# Patient Record
Sex: MALE | Race: WHITE | NOT HISPANIC OR LATINO
[De-identification: names, ages, dates, MRNs, and addresses within clinical notes are randomized per-mention and may not be internally consistent; named-entity substitution may affect disease eponyms.]

---

## 2020-10-16 PROBLEM — Z00.00 ENCOUNTER FOR PREVENTIVE HEALTH EXAMINATION: Status: ACTIVE | Noted: 2020-10-16

## 2020-11-06 ENCOUNTER — APPOINTMENT (OUTPATIENT)
Dept: CARDIOLOGY | Facility: CLINIC | Age: 80
End: 2020-11-06

## 2020-12-07 ENCOUNTER — APPOINTMENT (OUTPATIENT)
Dept: PULMONOLOGY | Facility: CLINIC | Age: 80
End: 2020-12-07
Payer: MEDICARE

## 2020-12-07 DIAGNOSIS — R91.1 SOLITARY PULMONARY NODULE: ICD-10-CM

## 2020-12-07 DIAGNOSIS — R91.8 OTHER NONSPECIFIC ABNORMAL FINDING OF LUNG FIELD: ICD-10-CM

## 2020-12-07 DIAGNOSIS — Z87.891 PERSONAL HISTORY OF NICOTINE DEPENDENCE: ICD-10-CM

## 2020-12-07 DIAGNOSIS — Z86.79 PERSONAL HISTORY OF OTHER DISEASES OF THE CIRCULATORY SYSTEM: ICD-10-CM

## 2020-12-07 PROCEDURE — 99072 ADDL SUPL MATRL&STAF TM PHE: CPT

## 2020-12-07 PROCEDURE — 99203 OFFICE O/P NEW LOW 30 MIN: CPT

## 2020-12-18 VITALS
HEART RATE: 68 BPM | DIASTOLIC BLOOD PRESSURE: 80 MMHG | RESPIRATION RATE: 16 BRPM | SYSTOLIC BLOOD PRESSURE: 140 MMHG | HEIGHT: 67 IN | OXYGEN SATURATION: 96 %

## 2020-12-18 PROBLEM — R91.1 LESION OF LUNG: Status: ACTIVE | Noted: 2020-12-07

## 2020-12-18 PROBLEM — R91.8 ABNORMAL FINDING ON LUNG IMAGING: Status: ACTIVE | Noted: 2020-12-18

## 2020-12-18 NOTE — DISCUSSION/SUMMARY
[FreeTextEntry1] : 79 yo male with incidentally noted pulmonary nodule. The various diagnostic possibilities were discussed. A PET/CT will be performed. Further intervention will depend on results. He is to follow up with her PMD as before and for the influenza vaccine.

## 2020-12-18 NOTE — HISTORY OF PRESENT ILLNESS
[Former] : former [>= 30 pack years] : >= 30 pack years [TextBox_4] : 81 yo male presents for evaluation of abnormal lung finding on abdominal CT performed by SER[facility]. The patient denies pulmonary complaints. He denies fever, weight loss, night sweats or hemoptysis. [TextBox_11] : 1 [TextBox_13] : 45 [YearQuit] : 1985 [TextBox_29] : Denies snoring, daytime somnolence, apneic episodes, AM headaches

## 2021-02-10 ENCOUNTER — NON-APPOINTMENT (OUTPATIENT)
Age: 81
End: 2021-02-10

## 2021-02-10 ENCOUNTER — APPOINTMENT (OUTPATIENT)
Dept: CARDIOLOGY | Facility: CLINIC | Age: 81
End: 2021-02-10
Payer: MEDICARE

## 2021-02-10 VITALS
WEIGHT: 210 LBS | DIASTOLIC BLOOD PRESSURE: 82 MMHG | HEART RATE: 87 BPM | TEMPERATURE: 97.1 F | RESPIRATION RATE: 16 BRPM | OXYGEN SATURATION: 98 % | SYSTOLIC BLOOD PRESSURE: 168 MMHG | BODY MASS INDEX: 33.75 KG/M2 | HEIGHT: 66 IN

## 2021-02-10 VITALS — SYSTOLIC BLOOD PRESSURE: 142 MMHG | DIASTOLIC BLOOD PRESSURE: 76 MMHG

## 2021-02-10 PROCEDURE — 99204 OFFICE O/P NEW MOD 45 MIN: CPT | Mod: 25

## 2021-02-10 PROCEDURE — 93000 ELECTROCARDIOGRAM COMPLETE: CPT

## 2021-02-10 PROCEDURE — 99072 ADDL SUPL MATRL&STAF TM PHE: CPT

## 2021-02-10 NOTE — HISTORY OF PRESENT ILLNESS
[FreeTextEntry1] : Patient is an 80 year old man with a history of CAD status post stents in the past, HTN, hyperlipidemia, and former tobacco use who presents today for establishment of care for his coronary artery disease. He states that he needs to see a new Cardiologist as his previous Cardiologist is not currently practicing. He states that he does cardiac tests every year. He states that he has been feeling well denying any chest pain, dyspnea at res, palpitations, headaches, and dizziness. He denies any limitations with his activities of daily living.

## 2021-02-10 NOTE — DISCUSSION/SUMMARY
[FreeTextEntry1] : IMPRESSION: Mr. GABRIEL is a 80 year old man with a history of CAD status post stents in the past, HTN, hyperlipidemia, and former tobacco use who presents today for establishment of care for his coronary artery disease. \par \par PLAN:\par 1. He states that he has had stents in the past, but could not comment when and to which artery. I have asked him to schedule an echocardiogram and a nuclear stress test given his CAD. He will continue on ASA 81 mg daily and Prasugrel 10 mg daily. We should consider either stopping his Prasugrel or changing to Clopidogrel, however, we decide after his stress test. \par 2. His blood pressure is mildly elevated. I have not made any changes at this time and he will continue on Imdur 30 mg daily, Metoprolol 100 mg daily, and Hyzaar 100-25 mg daily. He understands the importance of diet modification and limiting his sodium intake.\par 3. His goal LDL is less than 70 given his CAD. He will continue on Simvastatin 40 mg daily and will try to obtain his most recent blood test from your office.\par 4. He will follow up with me after his cardiac tests have been performed.

## 2021-02-10 NOTE — PHYSICAL EXAM
[General Appearance - Well Developed] : well developed [Well Groomed] : well groomed [Normal Appearance] : normal appearance [General Appearance - Well Nourished] : well nourished [No Deformities] : no deformities [General Appearance - In No Acute Distress] : no acute distress [Normal Conjunctiva] : the conjunctiva exhibited no abnormalities [FreeTextEntry1] : He was wearing a face mask during the examination.  [Normal Jugular Venous A Waves Present] : normal jugular venous A waves present [Normal Jugular Venous V Waves Present] : normal jugular venous V waves present [No Jugular Venous Doherty A Waves] : no jugular venous doherty A waves [Respiration, Rhythm And Depth] : normal respiratory rhythm and effort [Exaggerated Use Of Accessory Muscles For Inspiration] : no accessory muscle use [Auscultation Breath Sounds / Voice Sounds] : lungs were clear to auscultation bilaterally [Normal Rate] : normal [Rhythm Regular] : regular [Normal S1] : normal S1 [Normal S2] : normal S2 [S3] : no S3 [Right Carotid Bruit] : no bruit heard over the right carotid [I] : a grade 1 [Left Carotid Bruit] : no bruit heard over the left carotid [Bruit] : no bruit heard [No Pitting Edema] : no pitting edema present [Bowel Sounds] : normal bowel sounds [Abdomen Soft] : soft [Abdomen Tenderness] : non-tender [Abdomen Mass (___ Cm)] : no abdominal mass palpated [Abnormal Walk] : normal gait [Gait - Sufficient For Exercise Testing] : the gait was sufficient for exercise testing [Cyanosis, Localized] : no localized cyanosis [Nail Clubbing] : no clubbing of the fingernails [Petechial Hemorrhages (___cm)] : no petechial hemorrhages [Skin Color & Pigmentation] : normal skin color and pigmentation [] : no rash [No Skin Ulcers] : no skin ulcer [Oriented To Time, Place, And Person] : oriented to person, place, and time [Affect] : the affect was normal [Mood] : the mood was normal [No Anxiety] : not feeling anxious

## 2021-04-14 ENCOUNTER — APPOINTMENT (OUTPATIENT)
Dept: CARDIOLOGY | Facility: CLINIC | Age: 81
End: 2021-04-14
Payer: MEDICARE

## 2021-04-14 PROCEDURE — 99072 ADDL SUPL MATRL&STAF TM PHE: CPT

## 2021-04-14 PROCEDURE — 78452 HT MUSCLE IMAGE SPECT MULT: CPT

## 2021-04-14 PROCEDURE — 93015 CV STRESS TEST SUPVJ I&R: CPT

## 2021-04-14 PROCEDURE — 93306 TTE W/DOPPLER COMPLETE: CPT

## 2021-04-14 PROCEDURE — A9500: CPT

## 2021-06-06 ENCOUNTER — INPATIENT (INPATIENT)
Facility: HOSPITAL | Age: 81
LOS: 1 days | Discharge: HOME | DRG: 312 | End: 2021-06-08
Attending: HOSPITALIST | Admitting: HOSPITALIST
Payer: MEDICARE

## 2021-06-06 VITALS
HEART RATE: 80 BPM | OXYGEN SATURATION: 97 % | HEIGHT: 69 IN | DIASTOLIC BLOOD PRESSURE: 76 MMHG | TEMPERATURE: 98 F | WEIGHT: 199.96 LBS | SYSTOLIC BLOOD PRESSURE: 159 MMHG | RESPIRATION RATE: 18 BRPM

## 2021-06-06 LAB
ALBUMIN SERPL ELPH-MCNC: 4.2 G/DL — SIGNIFICANT CHANGE UP (ref 3.3–5)
ALP SERPL-CCNC: 57 U/L — SIGNIFICANT CHANGE UP (ref 40–120)
ALT FLD-CCNC: 17 U/L — SIGNIFICANT CHANGE UP (ref 10–45)
ANION GAP SERPL CALC-SCNC: 17 MMOL/L — SIGNIFICANT CHANGE UP (ref 5–17)
APPEARANCE UR: CLEAR — SIGNIFICANT CHANGE UP
AST SERPL-CCNC: 22 U/L — SIGNIFICANT CHANGE UP (ref 10–40)
BACTERIA # UR AUTO: NEGATIVE — SIGNIFICANT CHANGE UP
BASOPHILS # BLD AUTO: 0.03 K/UL — SIGNIFICANT CHANGE UP (ref 0–0.2)
BASOPHILS NFR BLD AUTO: 0.2 % — SIGNIFICANT CHANGE UP (ref 0–2)
BILIRUB SERPL-MCNC: 0.3 MG/DL — SIGNIFICANT CHANGE UP (ref 0.2–1.2)
BILIRUB UR-MCNC: NEGATIVE — SIGNIFICANT CHANGE UP
BUN SERPL-MCNC: 40 MG/DL — HIGH (ref 7–23)
CALCIUM SERPL-MCNC: 8.8 MG/DL — SIGNIFICANT CHANGE UP (ref 8.4–10.5)
CHLORIDE SERPL-SCNC: 105 MMOL/L — SIGNIFICANT CHANGE UP (ref 96–108)
CO2 SERPL-SCNC: 17 MMOL/L — LOW (ref 22–31)
COLOR SPEC: YELLOW — SIGNIFICANT CHANGE UP
CREAT SERPL-MCNC: 1.88 MG/DL — HIGH (ref 0.5–1.3)
DIFF PNL FLD: NEGATIVE — SIGNIFICANT CHANGE UP
EOSINOPHIL # BLD AUTO: 0.12 K/UL — SIGNIFICANT CHANGE UP (ref 0–0.5)
EOSINOPHIL NFR BLD AUTO: 0.9 % — SIGNIFICANT CHANGE UP (ref 0–6)
EPI CELLS # UR: 1 /HPF — SIGNIFICANT CHANGE UP
GLUCOSE SERPL-MCNC: 123 MG/DL — HIGH (ref 70–99)
GLUCOSE UR QL: NEGATIVE — SIGNIFICANT CHANGE UP
HCT VFR BLD CALC: 38.1 % — LOW (ref 39–50)
HGB BLD-MCNC: 12.5 G/DL — LOW (ref 13–17)
HYALINE CASTS # UR AUTO: 4 /LPF — HIGH (ref 0–2)
IMM GRANULOCYTES NFR BLD AUTO: 0.3 % — SIGNIFICANT CHANGE UP (ref 0–1.5)
KETONES UR-MCNC: NEGATIVE — SIGNIFICANT CHANGE UP
LEUKOCYTE ESTERASE UR-ACNC: NEGATIVE — SIGNIFICANT CHANGE UP
LYMPHOCYTES # BLD AUTO: 0.79 K/UL — LOW (ref 1–3.3)
LYMPHOCYTES # BLD AUTO: 6.1 % — LOW (ref 13–44)
MAGNESIUM SERPL-MCNC: 1.9 MG/DL — SIGNIFICANT CHANGE UP (ref 1.6–2.6)
MCHC RBC-ENTMCNC: 30.3 PG — SIGNIFICANT CHANGE UP (ref 27–34)
MCHC RBC-ENTMCNC: 32.8 GM/DL — SIGNIFICANT CHANGE UP (ref 32–36)
MCV RBC AUTO: 92.5 FL — SIGNIFICANT CHANGE UP (ref 80–100)
MONOCYTES # BLD AUTO: 1.17 K/UL — HIGH (ref 0–0.9)
MONOCYTES NFR BLD AUTO: 9.1 % — SIGNIFICANT CHANGE UP (ref 2–14)
NEUTROPHILS # BLD AUTO: 10.7 K/UL — HIGH (ref 1.8–7.4)
NEUTROPHILS NFR BLD AUTO: 83.4 % — HIGH (ref 43–77)
NITRITE UR-MCNC: NEGATIVE — SIGNIFICANT CHANGE UP
NRBC # BLD: 0 /100 WBCS — SIGNIFICANT CHANGE UP (ref 0–0)
NT-PROBNP SERPL-SCNC: 875 PG/ML — HIGH (ref 0–300)
PH UR: 6 — SIGNIFICANT CHANGE UP (ref 5–8)
PLATELET # BLD AUTO: 191 K/UL — SIGNIFICANT CHANGE UP (ref 150–400)
POTASSIUM SERPL-MCNC: 4.1 MMOL/L — SIGNIFICANT CHANGE UP (ref 3.5–5.3)
POTASSIUM SERPL-SCNC: 4.1 MMOL/L — SIGNIFICANT CHANGE UP (ref 3.5–5.3)
PROT SERPL-MCNC: 7.1 G/DL — SIGNIFICANT CHANGE UP (ref 6–8.3)
PROT UR-MCNC: ABNORMAL
RBC # BLD: 4.12 M/UL — LOW (ref 4.2–5.8)
RBC # FLD: 13.9 % — SIGNIFICANT CHANGE UP (ref 10.3–14.5)
RBC CASTS # UR COMP ASSIST: 1 /HPF — SIGNIFICANT CHANGE UP (ref 0–4)
SODIUM SERPL-SCNC: 139 MMOL/L — SIGNIFICANT CHANGE UP (ref 135–145)
SP GR SPEC: 1.02 — SIGNIFICANT CHANGE UP (ref 1.01–1.02)
TROPONIN T, HIGH SENSITIVITY RESULT: 11 NG/L — SIGNIFICANT CHANGE UP (ref 0–51)
UROBILINOGEN FLD QL: NEGATIVE — SIGNIFICANT CHANGE UP
WBC # BLD: 12.85 K/UL — HIGH (ref 3.8–10.5)
WBC # FLD AUTO: 12.85 K/UL — HIGH (ref 3.8–10.5)
WBC UR QL: 1 /HPF — SIGNIFICANT CHANGE UP (ref 0–5)

## 2021-06-06 PROCEDURE — 99285 EMERGENCY DEPT VISIT HI MDM: CPT

## 2021-06-06 PROCEDURE — 70486 CT MAXILLOFACIAL W/O DYE: CPT | Mod: 26,MA

## 2021-06-06 PROCEDURE — 93010 ELECTROCARDIOGRAM REPORT: CPT

## 2021-06-06 NOTE — ED PROVIDER NOTE - CLINICAL SUMMARY MEDICAL DECISION MAKING FREE TEXT BOX
Unwitnessed fall concern of syncope - high risk of arrhythmia or cardiac cause of syncope. CT to eval trauma, labs, ekg and admit

## 2021-06-06 NOTE — ED PROVIDER NOTE - NS ED ROS FT

## 2021-06-06 NOTE — ED PROVIDER NOTE - PROGRESS NOTE DETAILS
Tigre Duarte DO PGY3 - per family has cardiac w/u 1 month ago that was unremarkable - do not know if it was a stress test or an echo Attending MD Jennings: CT ?NPH, will admit to medicine

## 2021-06-06 NOTE — ED PROVIDER NOTE - OBJECTIVE STATEMENT
80yo M hx CAD s/p stent, CHF?, on DAP s/p unwitnessed fall at home onto floor now w/ ecchymosis to R orbit. Pt does not remember event and feels well now w/o any symptoms. Family states he is at baseline mental status now and is usual for him to be forgetful but has nto had an event like this. Pt was unwitnessed for 20 mins and was found awake/alert but diaphoretic and "hypotensive". 82yo M hx CAD s/p stent, CHF?, on DAP s/p unwitnessed fall at home onto floor now w/ ecchymosis to R orbit. Pt does not remember event and feels well now w/o any symptoms. Family states he is at baseline mental status now and is usual for him to be forgetful but has nto had an event like this. Pt was unwitnessed for 20 mins and was found awake/alert but diaphoretic and "hypotensive" per dtr at bedside.

## 2021-06-06 NOTE — ED PROVIDER NOTE - SHIFT CHANGE DETAILS
Attending MD Jennings: 81M found down, (20-30 min), no recall, cardiac hx, R eye ecchy, abrasion to face, CTH/CTMF/CTCSp, found hypotensive and diaphoretic, needs syncope work up, TBA

## 2021-06-06 NOTE — ED PROVIDER NOTE - ATTENDING CONTRIBUTION TO CARE
81y M hx of HTN, HLD, cardiac dz, on ASA and Effient here sp unwitnessed fall vs syncope. Has ecchymoses to R eyelid and abrasion over R eyebrow, PERRL, no midline spinal TTP, no ext or chest/back/abd ecchymoses, FROM in ext x4, awake alert, no recall for event. Dtr states down for max 20-30min. When found clammy, with "low BP." Normotensive here. Denies CP, dizziness, SOB, pain. CT head, MF, c/s to r/o traumatic injury. Labs, EKG, cardiac eval for possible syncope. TBA. 81y M hx of HTN, HLD, cardiac dz, on ASA and Effient here sp unwitnessed fall vs syncope. Has ecchymoses to R eyelid and abrasion over R eyebrow, PERRL, no midline spinal TTP, no ext or chest/back/abd ecchymoses, FROM in ext x4, awake alert, no recall for event-which dtr states is unusual for pt. Dtr states down for max 20-30min as was seen 20-30 min prior to finding down. When found clammy, with "low BP" but awake and alert. No incontinence. Normotensive here. Denies CP, dizziness, SOB, pain. CT head, MF, c/s to r/o traumatic injury. Labs, EKG, cardiac eval for possible syncope, less likely seizure. TBA.

## 2021-06-06 NOTE — ED PROVIDER NOTE - PHYSICAL EXAMINATION
Gen: Well appearing, NAD  Head: ecchymosis/abrasions surrounding R orbit  HEENT: PERRL, EOMI, MMM, normal conjunctiva, anicteric, neck supple  Lung: CTAB, no adventitious sounds  CV: RRR, no murmurs  Abd: soft, NTND, no rebound or guarding, no CVAT  MSK: No edema, no visible deformities  Neuro: CN II-XII grossly intact. 5/5 strength and normal sensation in all extremities. Ambulatory with stable gait.   Skin: Warm and dry, no evidence of rash  Psych: normal mood and affect Gen: Well appearing, NAD  Head: ecchymosis/abrasions surrounding R orbit and bridge of nose  HEENT: PERRL, EOMI, MMM, normal conjunctiva, anicteric, neck supple, no septal hematoma  Lung: CTAB, no adventitious sounds  CV: RRR, no murmurs  Abd: soft, NTND, no rebound or guarding, no CVAT  MSK: No edema, no visible deformities, no midline ttp, no bony ttp  Neuro: CN II-XII grossly intact. 5/5 strength and normal sensation in all extremities. Ambulatory with stable gait.   Skin: Warm and dry, no evidence of rash  Psych: normal mood and affect

## 2021-06-07 DIAGNOSIS — Z29.9 ENCOUNTER FOR PROPHYLACTIC MEASURES, UNSPECIFIED: ICD-10-CM

## 2021-06-07 DIAGNOSIS — I10 ESSENTIAL (PRIMARY) HYPERTENSION: ICD-10-CM

## 2021-06-07 DIAGNOSIS — N19 UNSPECIFIED KIDNEY FAILURE: ICD-10-CM

## 2021-06-07 DIAGNOSIS — R55 SYNCOPE AND COLLAPSE: ICD-10-CM

## 2021-06-07 DIAGNOSIS — I25.10 ATHEROSCLEROTIC HEART DISEASE OF NATIVE CORONARY ARTERY WITHOUT ANGINA PECTORIS: ICD-10-CM

## 2021-06-07 LAB
24R-OH-CALCIDIOL SERPL-MCNC: 41 NG/ML — SIGNIFICANT CHANGE UP (ref 30–80)
ANION GAP SERPL CALC-SCNC: 14 MMOL/L — SIGNIFICANT CHANGE UP (ref 5–17)
BUN SERPL-MCNC: 41 MG/DL — HIGH (ref 7–23)
CALCIUM SERPL-MCNC: 9.1 MG/DL — SIGNIFICANT CHANGE UP (ref 8.4–10.5)
CHLORIDE SERPL-SCNC: 105 MMOL/L — SIGNIFICANT CHANGE UP (ref 96–108)
CO2 SERPL-SCNC: 19 MMOL/L — LOW (ref 22–31)
CREAT SERPL-MCNC: 1.72 MG/DL — HIGH (ref 0.5–1.3)
GLUCOSE SERPL-MCNC: 107 MG/DL — HIGH (ref 70–99)
HCT VFR BLD CALC: 37.9 % — LOW (ref 39–50)
HGB BLD-MCNC: 12.4 G/DL — LOW (ref 13–17)
MCHC RBC-ENTMCNC: 30.4 PG — SIGNIFICANT CHANGE UP (ref 27–34)
MCHC RBC-ENTMCNC: 32.7 GM/DL — SIGNIFICANT CHANGE UP (ref 32–36)
MCV RBC AUTO: 92.9 FL — SIGNIFICANT CHANGE UP (ref 80–100)
NRBC # BLD: 0 /100 WBCS — SIGNIFICANT CHANGE UP (ref 0–0)
PLATELET # BLD AUTO: 182 K/UL — SIGNIFICANT CHANGE UP (ref 150–400)
POTASSIUM SERPL-MCNC: 4.3 MMOL/L — SIGNIFICANT CHANGE UP (ref 3.5–5.3)
POTASSIUM SERPL-SCNC: 4.3 MMOL/L — SIGNIFICANT CHANGE UP (ref 3.5–5.3)
RBC # BLD: 4.08 M/UL — LOW (ref 4.2–5.8)
RBC # FLD: 13.9 % — SIGNIFICANT CHANGE UP (ref 10.3–14.5)
SARS-COV-2 RNA SPEC QL NAA+PROBE: SIGNIFICANT CHANGE UP
SODIUM SERPL-SCNC: 138 MMOL/L — SIGNIFICANT CHANGE UP (ref 135–145)
T PALLIDUM AB TITR SER: NEGATIVE — SIGNIFICANT CHANGE UP
TROPONIN T, HIGH SENSITIVITY RESULT: 9 NG/L — SIGNIFICANT CHANGE UP (ref 0–51)
TSH SERPL-MCNC: 0.32 UIU/ML — SIGNIFICANT CHANGE UP (ref 0.27–4.2)
VIT B12 SERPL-MCNC: 738 PG/ML — SIGNIFICANT CHANGE UP (ref 232–1245)
WBC # BLD: 10.58 K/UL — HIGH (ref 3.8–10.5)
WBC # FLD AUTO: 10.58 K/UL — HIGH (ref 3.8–10.5)

## 2021-06-07 PROCEDURE — 70450 CT HEAD/BRAIN W/O DYE: CPT | Mod: 26,MA

## 2021-06-07 PROCEDURE — 76377 3D RENDER W/INTRP POSTPROCES: CPT | Mod: 26

## 2021-06-07 PROCEDURE — 72125 CT NECK SPINE W/O DYE: CPT | Mod: 26,MA

## 2021-06-07 PROCEDURE — 12345: CPT | Mod: NC

## 2021-06-07 PROCEDURE — 71045 X-RAY EXAM CHEST 1 VIEW: CPT | Mod: 26

## 2021-06-07 PROCEDURE — 99223 1ST HOSP IP/OBS HIGH 75: CPT

## 2021-06-07 RX ORDER — PRASUGREL 5 MG/1
1 TABLET, FILM COATED ORAL
Qty: 0 | Refills: 0 | DISCHARGE

## 2021-06-07 RX ORDER — ISOSORBIDE MONONITRATE 60 MG/1
30 TABLET, EXTENDED RELEASE ORAL DAILY
Refills: 0 | Status: DISCONTINUED | OUTPATIENT
Start: 2021-06-07 | End: 2021-06-08

## 2021-06-07 RX ORDER — METOPROLOL TARTRATE 50 MG
100 TABLET ORAL DAILY
Refills: 0 | Status: DISCONTINUED | OUTPATIENT
Start: 2021-06-07 | End: 2021-06-08

## 2021-06-07 RX ORDER — HEPARIN SODIUM 5000 [USP'U]/ML
5000 INJECTION INTRAVENOUS; SUBCUTANEOUS EVERY 8 HOURS
Refills: 0 | Status: DISCONTINUED | OUTPATIENT
Start: 2021-06-07 | End: 2021-06-08

## 2021-06-07 RX ORDER — ACETAMINOPHEN 500 MG
650 TABLET ORAL EVERY 6 HOURS
Refills: 0 | Status: DISCONTINUED | OUTPATIENT
Start: 2021-06-07 | End: 2021-06-08

## 2021-06-07 RX ORDER — PRASUGREL 5 MG/1
10 TABLET, FILM COATED ORAL DAILY
Refills: 0 | Status: DISCONTINUED | OUTPATIENT
Start: 2021-06-07 | End: 2021-06-08

## 2021-06-07 RX ORDER — SIMVASTATIN 20 MG/1
40 TABLET, FILM COATED ORAL AT BEDTIME
Refills: 0 | Status: DISCONTINUED | OUTPATIENT
Start: 2021-06-07 | End: 2021-06-08

## 2021-06-07 RX ORDER — METOPROLOL TARTRATE 50 MG
1 TABLET ORAL
Qty: 0 | Refills: 0 | DISCHARGE

## 2021-06-07 RX ORDER — ASPIRIN/CALCIUM CARB/MAGNESIUM 324 MG
1 TABLET ORAL
Qty: 0 | Refills: 0 | DISCHARGE

## 2021-06-07 RX ORDER — ASPIRIN/CALCIUM CARB/MAGNESIUM 324 MG
81 TABLET ORAL DAILY
Refills: 0 | Status: DISCONTINUED | OUTPATIENT
Start: 2021-06-07 | End: 2021-06-08

## 2021-06-07 RX ORDER — ISOSORBIDE MONONITRATE 60 MG/1
1 TABLET, EXTENDED RELEASE ORAL
Qty: 0 | Refills: 0 | DISCHARGE

## 2021-06-07 RX ORDER — THIAMINE MONONITRATE (VIT B1) 100 MG
100 TABLET ORAL DAILY
Refills: 0 | Status: DISCONTINUED | OUTPATIENT
Start: 2021-06-07 | End: 2021-06-08

## 2021-06-07 RX ORDER — LOSARTAN POTASSIUM 100 MG/1
50 TABLET, FILM COATED ORAL DAILY
Refills: 0 | Status: DISCONTINUED | OUTPATIENT
Start: 2021-06-07 | End: 2021-06-08

## 2021-06-07 RX ORDER — FOLIC ACID 0.8 MG
1 TABLET ORAL DAILY
Refills: 0 | Status: DISCONTINUED | OUTPATIENT
Start: 2021-06-07 | End: 2021-06-08

## 2021-06-07 RX ORDER — LOSARTAN/HYDROCHLOROTHIAZIDE 100MG-25MG
1 TABLET ORAL
Qty: 0 | Refills: 0 | DISCHARGE

## 2021-06-07 RX ADMIN — SIMVASTATIN 40 MILLIGRAM(S): 20 TABLET, FILM COATED ORAL at 23:01

## 2021-06-07 RX ADMIN — Medication 1 MILLIGRAM(S): at 16:34

## 2021-06-07 RX ADMIN — Medication 81 MILLIGRAM(S): at 11:26

## 2021-06-07 RX ADMIN — LOSARTAN POTASSIUM 50 MILLIGRAM(S): 100 TABLET, FILM COATED ORAL at 14:50

## 2021-06-07 RX ADMIN — PRASUGREL 10 MILLIGRAM(S): 5 TABLET, FILM COATED ORAL at 18:06

## 2021-06-07 RX ADMIN — ISOSORBIDE MONONITRATE 30 MILLIGRAM(S): 60 TABLET, EXTENDED RELEASE ORAL at 11:26

## 2021-06-07 RX ADMIN — Medication 100 MILLIGRAM(S): at 18:06

## 2021-06-07 RX ADMIN — Medication 100 MILLIGRAM(S): at 06:50

## 2021-06-07 RX ADMIN — HEPARIN SODIUM 5000 UNIT(S): 5000 INJECTION INTRAVENOUS; SUBCUTANEOUS at 23:01

## 2021-06-07 RX ADMIN — Medication 1 TABLET(S): at 14:50

## 2021-06-07 RX ADMIN — Medication 2 MILLIGRAM(S): at 15:58

## 2021-06-07 NOTE — H&P ADULT - HISTORY OF PRESENT ILLNESS
Patient is poor historian. Collateral obtained from son-in-law who is also a poor historian.    81M with PMHx of CAD (multiple stents, but placed many years prior) and HTN presents after being found on the floor after a fall. Patient lives with his wife and apparently was downstairs for approximately 30 minutes before being found by family members conscious and trying to get up. Patient states that he fell, but does not remember the inciting events and the fall was unwitnessed. Apparently, he was found sweating and "clammy." Patient initially AOx1 on arrival, but AOx3 upon my assessment. Patient was pan-scanned showing no hemorrhage or fractures. CT head did show possible normal pressure hydrocephalus. CT maxillofacial showing "Right periorbital and premaxillary soft tissue contusion. No retrobulbar hematoma." Patient apparently with recent ECHO on April 2021. He follows with Dr. Coreas for general cardiac care. Patient with EKG showing known RBBB. When seen by me in the ED he was standing up trying to adjust his telemetry wires. Patient visibly frustrated and diaphoretic. He is not able to tell me much besides that he fell. Patient with no complaints right now. He denies headache or right eye pain.

## 2021-06-07 NOTE — H&P ADULT - PROBLEM SELECTOR PLAN 3
-Hold Losartan now given possible ALDO  -Hold HCTZ given level of renal failure (whether acute or chronic, thiazide diuretics are not effective at current renal function)  -Continue with Imdur  -May need to add CCB if BP uncontrolled off ARB

## 2021-06-07 NOTE — H&P ADULT - PROBLEM SELECTOR PLAN 5
-DVT PPx:  -Diet:   -Dispo: -DVT PPx: Holding chemical given recent fall, resume in a day or two  -Diet: Low sodium  -Dispo: PT pending

## 2021-06-07 NOTE — PROVIDER CONTACT NOTE (OTHER) - ASSESSMENT
patient is a/ox2 at current time. otherwise VSS.  Pt is agitated, attempting to leave the hospital.  when received, pt was A&Ox3 disoriented to date, but knows year and month. per ED RN Aysha, pt was confused one time overnight, pulled out his IV. CT-H reveals no bleed.

## 2021-06-07 NOTE — PHYSICAL THERAPY INITIAL EVALUATION ADULT - ADDITIONAL COMMENTS
Patient lives in pvt house with daughter and grandson,7-8 steps to enter. Can function on one level. Patient ambulated with straight cane independent.

## 2021-06-07 NOTE — PHYSICAL THERAPY INITIAL EVALUATION ADULT - PATIENT/FAMILY AGREES WITH PLAN
Subacute Rehab; Pt with limitations in self-care & home management, will benefit from Sub acute rehab prior to D/C home to increase strength, balance and endurance to improve functional mobility to level necessary for safe return home.  If pt. to d/c home, would recommend home with PT and assist for all functional mobility. DME- Rolling walker, transport chair, bed side commode./yes

## 2021-06-07 NOTE — H&P ADULT - ASSESSMENT
81M with PMHx of CAD (multiple stents, but placed many years prior) and HTN presents after being found on the floor after a fall. Circumstances leading to fall unknown; thus, cannot say for certain if there was syncope.

## 2021-06-07 NOTE — H&P ADULT - PROBLEM SELECTOR PLAN 4
Unknown if this is acute or chronic. Will trend Cr and try to obtain collateral. Holding ARB for now, if chronic will resume promptly. Renally dose all medications

## 2021-06-07 NOTE — H&P ADULT - PROBLEM SELECTOR PLAN 1
As explained to son-in-law will be impossible to know if patient syncopized causing him to fall or fell mechanically. Thus far there is no indication he loss consciousness. I found patient in ED to be diaphoretic and slightly confused so it's possible he was in this state when at home. Per son-in-law patient suffering from progressive cognitive decline, though thinks his gait has been unchanged. May have slight concussive features (though no headache, photophobia or phonophobia)    -Monitor on telemetry (EKG reviewed showing RBBB (known) and prolonged QTc at 501)  -Perform orthostatics  -Obtain TTE results from April 2021  -Need more collateral from daughter who may known patient better than son-in-law  -Possible NPH seen on CT head (though may be hydrocephalus ex vacuo given cerebral atrophy) --> assess gait, consider neurology consult inpatient vs. outpatient  -Given cognitive decline: TSH, B12, Vitamin D, RPR  -Physical therapy consult

## 2021-06-07 NOTE — ED ADULT NURSE NOTE - OBJECTIVE STATEMENT
81yM presents to the ED from home s/p unwitnessed fall. PMHx of HTN, HLD, cardiac dz, on ASA and Effient. Family reports they found him lying on the floor, last seen ~30minutes prior to accident. Family reports pt was "clammy" upon being found. Upon arrival to ED, pt AAOx1 (oriented to self). Pt has abrasion to R eyebrow and bruising to R eye. Pt denies vision changes in ED. PERRL intact. Pt has no memory of falling or events prior to falling, unknown LOC. Pt has no increased WOB, labored respirations, or retractions. Pt abdomen soft and nontender to palpation. Pt has + pulses in UE and LE BL. Pt has no edema in LE BL. Pt able to ambulate with assistance. Full ROM of all extremities. Pt denies any pain or discomfort. IV placed and labs drawn and sent. Bed locked in lowest position with appropriate side rails raised. Pt given call bell and explained call bell system. Pt has no other questions or concerns at this time.

## 2021-06-07 NOTE — CHART NOTE - NSCHARTNOTEFT_GEN_A_CORE
Constant Observation   Pt is sp Syncope vs. mechanical fall and is admitted for workup, pt placed on constant observation for safety as he on assessment is anxious and attempting to climb OOB, oriented to place but unable to comprehend medical plan.   For safety will keep on Constant observation.

## 2021-06-07 NOTE — PHYSICAL THERAPY INITIAL EVALUATION ADULT - DISCHARGE DISPOSITION, PT EVAL
Subacute Rehab; Pt with limitations in self-care & home management, will benefit from Sub acute rehab prior to D/C home to increase strength, balance and endurance to improve functional mobility to level necessary for safe return home.  If pt. to d/c home, would recommend home with PT and assist for all functional mobility. DME- Rolling walker, transport chair, bed side commode./rehabilitation facility

## 2021-06-07 NOTE — H&P ADULT - NSHPPHYSICALEXAM_GEN_ALL_CORE
T(C): 36.8 (06-07-21 @ 01:25), Max: 36.8 (06-07-21 @ 01:25)  HR: 86 (06-07-21 @ 01:25) (80 - 86)  BP: 142/69 (06-07-21 @ 01:25) (142/69 - 159/76)  RR: 20 (06-07-21 @ 01:25) (18 - 20)  SpO2: 99% (06-07-21 @ 01:25) (97% - 99%)    Gen: male in NAD, appears comfortable, no diaphoresis  HEENT: MMM, neck soft and supple, +right periorbital edema  CV: +S1/S2, no m/r/g  Resp: CTAB, no w/r/r  GI: normoactive BS, soft, NTND, no rebounding/guarding  Ext: No LE edema, extremities appear warm and well perfused   Neuro: AOx3, no focal deficits, CNII-XII grossly intact  Psych: No SI/HI/AVH, appropriate affect  Skin: no petechiae, ecchymosis or maculopapular rash noted

## 2021-06-07 NOTE — CHART NOTE - NSCHARTNOTEFT_GEN_A_CORE
It seems overnight patient has been found confused twice, once by me and the RN. When found to by me on initial assessment patient standing outside of his stretcher diaphoretic, tremulous and somewhat confused as he was trying to untangle his telemetry wires. He has difficulty taking the few steps needed to get back into his stretcher and even then had to repeat commands several times for patient to sit onto the stretcher. Throughout this he was AOx3 and denies feeling hot. It seems he was found in a similar state one hour later by RN with him pulling out his IV. Personally I don't suspect what we are seeing now to be a result of the fall, but rather likely led to it. I doubt he is confused from a concussion.    Overall, I think we need more collateral as there may be a primary neurological issue that may tie in his fall, cognitive decline, and possible imaging we see on CT head. I spoke with son-in-law who does not appear to know the intricacies of patient's medical issues. I would attempt to speak with his daughter. Strict fall risk protocol. Likely will benefit from neurology consult.

## 2021-06-07 NOTE — H&P ADULT - PROBLEM SELECTOR PLAN 2
-Continue with baby aspirin  -Hold Prasugrel given recent fall and stents placed >1 year prior --> defer to outpatient cardiology for management  -Continue with statin  -Continue with metoprolol succinate 100 mg

## 2021-06-07 NOTE — ED ADULT NURSE REASSESSMENT NOTE - NS ED NURSE REASSESS COMMENT FT1
Patient found by RN about to climb out of stretcher, safely escorted by RN back into stretcher, and patient pulled IV. Patient reassessed, A&Ox4, states he is in the hospital because he fell. Patient with tremors at rest. Patient to have new IV initiated by RN.

## 2021-06-07 NOTE — ED ADULT NURSE NOTE - NSIMPLEMENTINTERV_GEN_ALL_ED
Implemented All Fall Risk Interventions:  [place] to call system. Call bell, personal items and telephone within reach. Instruct patient to call for assistance. Room bathroom lighting operational. Non-slip footwear when patient is off stretcher. Physically safe environment: no spills, clutter or unnecessary equipment. Stretcher in lowest position, wheels locked, appropriate side rails in place. Provide visual cue, wrist band, yellow gown, etc. Monitor gait and stability. Monitor for mental status changes and reorient to person, place, and time. Review medications for side effects contributing to fall risk. Reinforce activity limits and safety measures with patient and family.

## 2021-06-07 NOTE — PHYSICAL THERAPY INITIAL EVALUATION ADULT - PERTINENT HX OF CURRENT PROBLEM, REHAB EVAL
81M with PMHx of CAD (multiple stents, but placed many years prior) and HTN presents after being found on the floor after a fall. Patient lives with his wife and apparently was downstairs for approximately 30 minutes before being found by family members conscious and trying to get up. CT head - no acute pathology, did show possible normal pressure hydrocephalus. CT maxillofacial showing "Right periorbital and premaxillary soft tissue contusion. No retrobulbar hematoma."

## 2021-06-08 ENCOUNTER — TRANSCRIPTION ENCOUNTER (OUTPATIENT)
Age: 81
End: 2021-06-08

## 2021-06-08 VITALS
TEMPERATURE: 98 F | HEART RATE: 94 BPM | SYSTOLIC BLOOD PRESSURE: 166 MMHG | RESPIRATION RATE: 18 BRPM | OXYGEN SATURATION: 96 % | DIASTOLIC BLOOD PRESSURE: 80 MMHG

## 2021-06-08 LAB
ALBUMIN SERPL ELPH-MCNC: 4.2 G/DL — SIGNIFICANT CHANGE UP (ref 3.3–5)
ALP SERPL-CCNC: 65 U/L — SIGNIFICANT CHANGE UP (ref 40–120)
ALT FLD-CCNC: 16 U/L — SIGNIFICANT CHANGE UP (ref 10–45)
ANION GAP SERPL CALC-SCNC: 18 MMOL/L — HIGH (ref 5–17)
AST SERPL-CCNC: 25 U/L — SIGNIFICANT CHANGE UP (ref 10–40)
BILIRUB SERPL-MCNC: 0.8 MG/DL — SIGNIFICANT CHANGE UP (ref 0.2–1.2)
BUN SERPL-MCNC: 38 MG/DL — HIGH (ref 7–23)
CALCIUM SERPL-MCNC: 9.6 MG/DL — SIGNIFICANT CHANGE UP (ref 8.4–10.5)
CHLORIDE SERPL-SCNC: 102 MMOL/L — SIGNIFICANT CHANGE UP (ref 96–108)
CO2 SERPL-SCNC: 20 MMOL/L — LOW (ref 22–31)
COVID-19 SPIKE DOMAIN AB INTERP: POSITIVE
COVID-19 SPIKE DOMAIN ANTIBODY RESULT: >250 U/ML — HIGH
CREAT SERPL-MCNC: 1.58 MG/DL — HIGH (ref 0.5–1.3)
GLUCOSE SERPL-MCNC: 121 MG/DL — HIGH (ref 70–99)
HCT VFR BLD CALC: 39.4 % — SIGNIFICANT CHANGE UP (ref 39–50)
HGB BLD-MCNC: 12.8 G/DL — LOW (ref 13–17)
MAGNESIUM SERPL-MCNC: 1.6 MG/DL — SIGNIFICANT CHANGE UP (ref 1.6–2.6)
MCHC RBC-ENTMCNC: 30.5 PG — SIGNIFICANT CHANGE UP (ref 27–34)
MCHC RBC-ENTMCNC: 32.5 GM/DL — SIGNIFICANT CHANGE UP (ref 32–36)
MCV RBC AUTO: 93.8 FL — SIGNIFICANT CHANGE UP (ref 80–100)
NRBC # BLD: 0 /100 WBCS — SIGNIFICANT CHANGE UP (ref 0–0)
PHOSPHATE SERPL-MCNC: 3 MG/DL — SIGNIFICANT CHANGE UP (ref 2.5–4.5)
PLATELET # BLD AUTO: 169 K/UL — SIGNIFICANT CHANGE UP (ref 150–400)
POTASSIUM SERPL-MCNC: 3.4 MMOL/L — LOW (ref 3.5–5.3)
POTASSIUM SERPL-SCNC: 3.4 MMOL/L — LOW (ref 3.5–5.3)
PROT SERPL-MCNC: 7.4 G/DL — SIGNIFICANT CHANGE UP (ref 6–8.3)
RBC # BLD: 4.2 M/UL — SIGNIFICANT CHANGE UP (ref 4.2–5.8)
RBC # FLD: 13.8 % — SIGNIFICANT CHANGE UP (ref 10.3–14.5)
SARS-COV-2 IGG+IGM SERPL QL IA: >250 U/ML — HIGH
SARS-COV-2 IGG+IGM SERPL QL IA: POSITIVE
SODIUM SERPL-SCNC: 140 MMOL/L — SIGNIFICANT CHANGE UP (ref 135–145)
WBC # BLD: 8.46 K/UL — SIGNIFICANT CHANGE UP (ref 3.8–10.5)
WBC # FLD AUTO: 8.46 K/UL — SIGNIFICANT CHANGE UP (ref 3.8–10.5)

## 2021-06-08 PROCEDURE — 71045 X-RAY EXAM CHEST 1 VIEW: CPT

## 2021-06-08 PROCEDURE — 82306 VITAMIN D 25 HYDROXY: CPT

## 2021-06-08 PROCEDURE — 70486 CT MAXILLOFACIAL W/O DYE: CPT

## 2021-06-08 PROCEDURE — 86769 SARS-COV-2 COVID-19 ANTIBODY: CPT

## 2021-06-08 PROCEDURE — 82607 VITAMIN B-12: CPT

## 2021-06-08 PROCEDURE — 80053 COMPREHEN METABOLIC PANEL: CPT

## 2021-06-08 PROCEDURE — 80048 BASIC METABOLIC PNL TOTAL CA: CPT

## 2021-06-08 PROCEDURE — 81001 URINALYSIS AUTO W/SCOPE: CPT

## 2021-06-08 PROCEDURE — 93306 TTE W/DOPPLER COMPLETE: CPT

## 2021-06-08 PROCEDURE — 83735 ASSAY OF MAGNESIUM: CPT

## 2021-06-08 PROCEDURE — 70450 CT HEAD/BRAIN W/O DYE: CPT

## 2021-06-08 PROCEDURE — 87635 SARS-COV-2 COVID-19 AMP PRB: CPT

## 2021-06-08 PROCEDURE — 99239 HOSP IP/OBS DSCHRG MGMT >30: CPT

## 2021-06-08 PROCEDURE — 72125 CT NECK SPINE W/O DYE: CPT

## 2021-06-08 PROCEDURE — 85027 COMPLETE CBC AUTOMATED: CPT

## 2021-06-08 PROCEDURE — 84484 ASSAY OF TROPONIN QUANT: CPT

## 2021-06-08 PROCEDURE — 97162 PT EVAL MOD COMPLEX 30 MIN: CPT

## 2021-06-08 PROCEDURE — 84100 ASSAY OF PHOSPHORUS: CPT

## 2021-06-08 PROCEDURE — 83880 ASSAY OF NATRIURETIC PEPTIDE: CPT

## 2021-06-08 PROCEDURE — 76377 3D RENDER W/INTRP POSTPROCES: CPT

## 2021-06-08 PROCEDURE — G0378: CPT

## 2021-06-08 PROCEDURE — 86780 TREPONEMA PALLIDUM: CPT

## 2021-06-08 PROCEDURE — 84443 ASSAY THYROID STIM HORMONE: CPT

## 2021-06-08 PROCEDURE — 93306 TTE W/DOPPLER COMPLETE: CPT | Mod: 26

## 2021-06-08 PROCEDURE — 99285 EMERGENCY DEPT VISIT HI MDM: CPT | Mod: 25

## 2021-06-08 PROCEDURE — 85025 COMPLETE CBC W/AUTO DIFF WBC: CPT

## 2021-06-08 RX ORDER — SIMVASTATIN 20 MG/1
1 TABLET, FILM COATED ORAL
Qty: 0 | Refills: 0 | DISCHARGE
Start: 2021-06-08

## 2021-06-08 RX ORDER — LOSARTAN POTASSIUM 100 MG/1
100 TABLET, FILM COATED ORAL DAILY
Refills: 0 | Status: DISCONTINUED | OUTPATIENT
Start: 2021-06-08 | End: 2021-06-08

## 2021-06-08 RX ORDER — FOLIC ACID 0.8 MG
1 TABLET ORAL
Qty: 0 | Refills: 0 | DISCHARGE
Start: 2021-06-08

## 2021-06-08 RX ORDER — SIMVASTATIN 20 MG/1
1 TABLET, FILM COATED ORAL
Qty: 0 | Refills: 0 | DISCHARGE

## 2021-06-08 RX ORDER — POTASSIUM CHLORIDE 20 MEQ
40 PACKET (EA) ORAL ONCE
Refills: 0 | Status: COMPLETED | OUTPATIENT
Start: 2021-06-08 | End: 2021-06-08

## 2021-06-08 RX ORDER — THIAMINE MONONITRATE (VIT B1) 100 MG
1 TABLET ORAL
Qty: 0 | Refills: 0 | DISCHARGE
Start: 2021-06-08

## 2021-06-08 RX ADMIN — PRASUGREL 10 MILLIGRAM(S): 5 TABLET, FILM COATED ORAL at 11:38

## 2021-06-08 RX ADMIN — HEPARIN SODIUM 5000 UNIT(S): 5000 INJECTION INTRAVENOUS; SUBCUTANEOUS at 06:42

## 2021-06-08 RX ADMIN — Medication 2 MILLIGRAM(S): at 09:19

## 2021-06-08 RX ADMIN — LOSARTAN POTASSIUM 50 MILLIGRAM(S): 100 TABLET, FILM COATED ORAL at 06:41

## 2021-06-08 RX ADMIN — Medication 100 MILLIGRAM(S): at 11:38

## 2021-06-08 RX ADMIN — Medication 2 MILLIGRAM(S): at 14:28

## 2021-06-08 RX ADMIN — HEPARIN SODIUM 5000 UNIT(S): 5000 INJECTION INTRAVENOUS; SUBCUTANEOUS at 13:18

## 2021-06-08 RX ADMIN — ISOSORBIDE MONONITRATE 30 MILLIGRAM(S): 60 TABLET, EXTENDED RELEASE ORAL at 11:37

## 2021-06-08 RX ADMIN — Medication 100 MILLIGRAM(S): at 06:40

## 2021-06-08 RX ADMIN — Medication 1 MILLIGRAM(S): at 11:38

## 2021-06-08 RX ADMIN — Medication 81 MILLIGRAM(S): at 11:37

## 2021-06-08 RX ADMIN — LOSARTAN POTASSIUM 100 MILLIGRAM(S): 100 TABLET, FILM COATED ORAL at 12:29

## 2021-06-08 RX ADMIN — Medication 40 MILLIEQUIVALENT(S): at 08:22

## 2021-06-08 RX ADMIN — Medication 1 TABLET(S): at 11:38

## 2021-06-08 NOTE — PROGRESS NOTE ADULT - PROBLEM SELECTOR PLAN 3
BP has been stable. Scr 1.7  - c/w Losartan  - Hold diuretic
BP has been stable. Scr 1.7  - c/w Losartan 100mg/d, Metoprolol ER 100mg/d  - Hold diuretic

## 2021-06-08 NOTE — PROGRESS NOTE ADULT - PROBLEM SELECTOR PLAN 2
No chest pain, SOB  - Tele, Echo  - c/w ASA, Metoprolol, Statin and prasugrel at home doses
No chest pain, SOB  - Tele, Echo  - c/w ASA, Metoprolol, Statin and prasugrel at home doses

## 2021-06-08 NOTE — DISCHARGE NOTE NURSING/CASE MANAGEMENT/SOCIAL WORK - PATIENT PORTAL LINK FT
You can access the FollowMyHealth Patient Portal offered by [facility] by registering at the following website: [email or web address]. By joining [employer]’s FollowMyHealth portal, you will also be able to view your health information using other applications (apps) compatible with our system.

## 2021-06-08 NOTE — PROGRESS NOTE ADULT - ASSESSMENT
81M with PMHx of CAD (multiple stents, but placed many years prior) and HTN presents after being found on the floor after a fall. Circumstances leading to fall unknown; thus, cannot say for certain if there was syncope.
81M with PMHx of CAD (multiple stents, but placed many years prior) and HTN presents after being found on the floor after a fall. Circumstances leading to fall unknown; thus, cannot say for certain if there was syncope.

## 2021-06-08 NOTE — PROGRESS NOTE ADULT - SUBJECTIVE AND OBJECTIVE BOX
Mohsin Khan, MD  Attending Physician, Division Of Hospital Medicine  Pager: [phone], Office: [phone]  Off hour pager: [phone]    Patient is a 81y old  Male who presents with a chief complaint of Fall     SUBJECTIVE / OVERNIGHT EVENTS:  Seen, examined the patient this am, spoke to grand daughter doctor Cori over the phone from bedside  Sitting in bed, feels ok, wants to go home. Was agitated and confused last night per RN. Noted some tremor, BP is better, afebrile, no HA    MEDICATIONS  (STANDING):  aspirin enteric coated 81 milliGRAM(s) Oral daily  folic acid 1 milliGRAM(s) Oral daily  heparin   Injectable 5000 Unit(s) SubCutaneous every 8 hours  isosorbide   mononitrate ER Tablet (IMDUR) 30 milliGRAM(s) Oral daily  LORazepam     Tablet 2 milliGRAM(s) Oral once  losartan 100 milliGRAM(s) Oral daily  metoprolol succinate  milliGRAM(s) Oral daily  multivitamin 1 Tablet(s) Oral daily  prasugrel 10 milliGRAM(s) Oral daily  simvastatin 40 milliGRAM(s) Oral at bedtime  thiamine 100 milliGRAM(s) Oral daily    MEDICATIONS  (PRN):  acetaminophen   Tablet .. 650 milliGRAM(s) Oral every 6 hours PRN Temp greater or equal to 38C (100.4F), Mild Pain (1 - 3), Moderate Pain (4 - 6), Severe Pain (7 - 10)  LORazepam     Tablet 2 milliGRAM(s) Oral three times a day PRN Agitation      Vital Signs Last 24 Hrs  T(C): 36.6 (2021 04:05), Max: 36.8 (2021 17:01)  T(F): 97.9 (2021 04:05), Max: 98.2 (2021 17:01)  HR: 100 (2021 04:05) (84 - 100)  BP: 167/84 (2021 04:05) (149/78 - 201/96)  BP(mean): --  RR: 18 (2021 04:05) (18 - 18)  SpO2: 96% (2021 04:05) (96% - 98%)  CAPILLARY BLOOD GLUCOSE        I&O's Summary    2021 07:01  -  2021 07:00  --------------------------------------------------------  IN: 0 mL / OUT: 1150 mL / NET: -1150 mL    2021 07:01  -  2021 14:19  --------------------------------------------------------  IN: 0 mL / OUT: 400 mL / NET: -400 mL        PHYSICAL EXAM:-  GENERAL: NAD, well-developed  EYES: EOMI, PERRLA, conjunctiva and sclera clear  NECK: Supple, No JVD, no thyromegaly  CHEST/LUNG: Clear to auscultation bilaterally; No wheeze  HEART: Regular rate and rhythm; S1, S2 audible, No murmurs, rubs, or gallops  ABDOMEN: Soft, Nontender, Nondistended; Bowel sounds present  EXTREMITIES:  2+ Peripheral Pulses, No clubbing, cyanosis, or edema  NEURO: AAOx3, no focal deficit, fine tremor noted      LABS:                        12.8   8.46  )-----------( 169      ( 2021 05:56 )             39.4     06-08    140  |  102  |  38<H>  ----------------------------<  121<H>  3.4<L>   |  20<L>  |  1.58<H>    Ca    9.6      2021 05:56  Phos  3.0     06-08  Mg     1.6     06-08    TPro  7.4  /  Alb  4.2  /  TBili  0.8  /  DBili  x   /  AST  25  /  ALT  16  /  AlkPhos  65  06-08          Urinalysis Basic - ( 2021 23:50 )    Color: Yellow / Appearance: Clear / S.019 / pH: x  Gluc: x / Ketone: Negative  / Bili: Negative / Urobili: Negative   Blood: x / Protein: Trace / Nitrite: Negative   Leuk Esterase: Negative / RBC: 1 /hpf / WBC 1 /HPF   Sq Epi: x / Non Sq Epi: 1 /hpf / Bacteria: Negative        RADIOLOGY & ADDITIONAL TESTS:    Imaging Personally Reviewed: CT brain, C- spine    
Mohsin Khan, MD  Attending Physician, Division Of Hospital Medicine  Pager: [phone], Office: [phone]  Off hour pager: [phone]    Patient is a 81y old  Male who presents with a chief complaint of Fall     SUBJECTIVE / OVERNIGHT EVENTS:  Seen, examined the patient this am in ED  Sitting on a chair in holding area, on 1:1. Feels ok, no chest pain, SOB, no tremor. climbing OOB, not agitated, vitals stable  No Tele event    MEDICATIONS  (STANDING):  aspirin enteric coated 81 milliGRAM(s) Oral daily  folic acid 1 milliGRAM(s) Oral daily  isosorbide   mononitrate ER Tablet (IMDUR) 30 milliGRAM(s) Oral daily  metoprolol succinate  milliGRAM(s) Oral daily  multivitamin 1 Tablet(s) Oral daily  simvastatin 40 milliGRAM(s) Oral at bedtime  thiamine 100 milliGRAM(s) Oral daily    MEDICATIONS  (PRN):  acetaminophen   Tablet .. 650 milliGRAM(s) Oral every 6 hours PRN Temp greater or equal to 38C (100.4F), Mild Pain (1 - 3), Moderate Pain (4 - 6), Severe Pain (7 - 10)  LORazepam     Tablet 2 milliGRAM(s) Oral three times a day PRN Agitation      Vital Signs Last 24 Hrs  T(C): 36.8 (2021 11:01), Max: 36.9 (2021 04:31)  T(F): 98.3 (2021 11:01), Max: 98.5 (2021 04:31)  HR: 84 (2021 13:54) (80 - 97)  BP: 178/66 (2021 11:01) (142/69 - 190/86)  BP(mean): 97 (2021 05:30) (90 - 114)  RR: 18 (2021 13:54) (18 - 26)  SpO2: 98% (2021 13:54) (95% - 100%)  CAPILLARY BLOOD GLUCOSE        I&O's Summary      PHYSICAL EXAM:-  GENERAL: NAD, well-developed  EYES: EOMI, PERRLA, conjunctiva and sclera clear  NECK: Supple, No JVD, no thyromegaly  CHEST/LUNG: Clear to auscultation bilaterally; No wheeze  HEART: Regular rate and rhythm; S1, S2 audible, No murmurs, rubs, or gallops  ABDOMEN: Soft, Nontender, Nondistended; Bowel sounds present  EXTREMITIES:  2+ Peripheral Pulses, No clubbing, cyanosis, or edema  NEURO: AAOx3, no focal deficit      LABS:                        12.4   10.58 )-----------( 182      ( 2021 07:52 )             37.9     06-    138  |  105  |  41<H>  ----------------------------<  107<H>  4.3   |  19<L>  |  1.72<H>    Ca    9.1      2021 07:52  Mg     1.9     -    TPro  7.1  /  Alb  4.2  /  TBili  0.3  /  DBili  x   /  AST  22  /  ALT  17  /  AlkPhos  57  -          Urinalysis Basic - ( 2021 23:50 )    Color: Yellow / Appearance: Clear / S.019 / pH: x  Gluc: x / Ketone: Negative  / Bili: Negative / Urobili: Negative   Blood: x / Protein: Trace / Nitrite: Negative   Leuk Esterase: Negative / RBC: 1 /hpf / WBC 1 /HPF   Sq Epi: x / Non Sq Epi: 1 /hpf / Bacteria: Negative        RADIOLOGY & ADDITIONAL TESTS:    Imaging Personally Reviewed: CT brain, C- spine, CXR

## 2021-06-08 NOTE — CHART NOTE - NSCHARTNOTEFT_GEN_A_CORE
Reevaluated patient as Grand daughter- Cori ( MD at [facility]) at bedside. He has been sundowning, less tremulous as per conversation. She is not concerned about alcohol withdrawal and wants to take him home.  Echo in progress. No Tele event & vitals are stable  - d/c home if Echo remains unremarkable. She will bring him back if any withdrawal s/s  - Outpatient f/u with PCP, Cardiologist  ** d/c time 43 min

## 2021-06-08 NOTE — DISCHARGE NOTE PROVIDER - NSDCMRMEDTOKEN_GEN_ALL_CORE_FT
aspirin 81 mg oral tablet: 1 tab(s) orally once a day  folic acid 1 mg oral tablet: 1 tab(s) orally once a day  isosorbide mononitrate 30 mg oral tablet, extended release: 1 tab(s) orally once a day (in the morning)  LORazepam 2 mg oral tablet: 1 tab(s) orally once a day, As Needed -Agitation MDD:1  losartan-hydrochlorothiazide 100 mg-25 mg oral tablet: 1 tab(s) orally once a day  Metoprolol Succinate  mg oral tablet, extended release: 1 tab(s) orally once a day  Multiple Vitamins oral tablet: 1 tab(s) orally once a day  prasugrel 10 mg oral tablet: 1 tab(s) orally once a day  simvastatin 40 mg oral tablet: 1 tab(s) orally once a day (at bedtime)  thiamine 100 mg oral tablet: 1 tab(s) orally once a day

## 2021-06-08 NOTE — DISCHARGE NOTE PROVIDER - CARE PROVIDER_API CALL
VALERY ESCOBAR  Internal Medicine  [address] [place] [address]  Phone: [phone]  Fax: [phone]  Follow Up Time:

## 2021-06-08 NOTE — DISCHARGE NOTE PROVIDER - HOSPITAL COURSE
81M with PMHx of CAD (multiple stents, but placed many years prior) and HTN presents after being found on the floor after a fall. Circumstances leading to fall unknown; thus, cannot say for certain if there was syncope.     Problem/Plan - 1:  ·  Problem: R/O Syncope.  Plan: After a non-witnessed fall in the Kitchen at home, he couldn't get up by himself per Grand daughter (MD Cori) [phone]. No c/o chest pain, SOB, palpitations, dizziness.  Patient has been drinking more alcohol than usual after his sister  recently. Never had withdrawal symptoms or ETOH rehab. He has good balance and had no fall in the past.   - Recent stress test was normal per Dr. Persaud. CT brain, C- spine unremarkable ? NPH  - No Tele event, orthostatic vitals are normal,     awaiting on Echo  - PT, fall precaution   - CIWA with symptoms triggered Ativan prn, Thiamin, Folate  - 1:1 observation  ** Grand daughter (MD Cori) [phone].      Problem/Plan - 2:  ·  Problem: CAD (coronary artery disease).  Plan: No chest pain, SOB  - Tele, Echo  - c/w ASA, Metoprolol, Statin and prasugrel at home doses.      Problem/Plan - 3:  ·  Problem: Hypertension.  Plan: BP has been stable. Scr 1.7  - c/w Losartan 100mg/d, Metoprolol ER 100mg/d  - diuretic held.. to be restarted upon discharge.        Problem/Plan - 4:  ·  Problem: Renal failure.  Plan: Likely CKD 3. Scr 1.5  - c/w Losartan,      Problem/Plan - 5:  ·  Problem: Prophylactic measure.  Plan: heparin sc.     COVID vaccine up to date. 81M with PMHx of CAD (multiple stents, but placed many years prior) and HTN presents after being found on the floor after a fall. Circumstances leading to fall unknown; thus, cannot say for certain if there was syncope.     Problem/Plan - 1:  ·  Problem: R/O Syncope.  Plan: After a non-witnessed fall in the Kitchen at home, he couldn't get up by himself per Grand daughter (MD Cori) [phone]. No c/o chest pain, SOB, palpitations, dizziness.  Patient has been drinking more alcohol than usual after his sister  recently. Never had withdrawal symptoms or ETOH rehab. He has good balance and had no fall in the past.   - Recent stress test was normal per Dr. Persaud. CT brain, C- spine unremarkable ? NPH  - No Tele event, orthostatic vitals are normal,     awaiting on Echo  - PT, fall precaution   - CIWA with symptoms triggered Ativan prn, Thiamin, Folate  - 1:1 observation  ** Grand daughter (MD Cori) [phone].      Problem/Plan - 2:  ·  Problem: CAD (coronary artery disease).  Plan: No chest pain, SOB  - Tele, Echo  - c/w ASA, Metoprolol, Statin and prasugrel at home doses.      Problem/Plan - 3:  ·  Problem: Hypertension.  Plan: BP has been stable. Scr 1.7  - c/w Losartan 100mg/d, Metoprolol ER 100mg/d  - diuretic held.. to be restarted upon discharge.        Problem/Plan - 4:  ·  Problem: Renal failure.  Plan: Likely CKD 3. Scr 1.5  - c/w Losartan,      Problem/Plan - 5:  ·  Problem: Prophylactic measure.  Plan: heparin sc.     COVID vaccine up to date.  PT was also c/s and recommended MICHELLE. however, family would like to take him home.    81M with PMHx of CAD (multiple stents, but placed many years prior) and HTN presents after being found on the floor after a fall. Circumstances leading to fall unknown; thus, cannot say for certain if there was syncope.     Problem/Plan - 1:  ·  Problem: R/O Syncope.  Plan: After a non-witnessed fall in the Kitchen at home, he couldn't get up by himself per Grand daughter (MD Cori) [phone]. No c/o chest pain, SOB, palpitations, dizziness.  Patient has been drinking more alcohol than usual after his sister  recently. Never had withdrawal symptoms or ETOH rehab. He has good balance and had no fall in the past.   - Recent stress test was normal per Dr. Persaud. CT brain, C- spine unremarkable ? NPH  - No Tele event, orthostatic vitals are normal,     awaiting on Echo  - PT, fall precaution   - CIWA with symptoms triggered Ativan prn, Thiamin, Folate  - 1:1 observation  ** Grand daughter (MD Cori) [phone].      Problem/Plan - 2:  ·  Problem: CAD (coronary artery disease).  Plan: No chest pain, SOB  - Tele,   Echo:  Mitral Valve: Normal mitral valve. Mitral annular  calcification.  Aortic Valve/Aorta: Calcified aortic valve with normal  opening.  Normal aortic root size.  Left Atrium: Mildly dilated left atrium.  Left Ventricle: Normal left ventricular internal dimensions  and wall thicknesses.  Hyperdynamic left ventricle.  Right Heart: Normal right atrium. Normal right ventricular  size and function.  Normal tricuspid valve. Normal pulmonic valve.  Pericardium/Pleura: Normal pericardium with no pericardial  effusion. Pericardial fat pad noted.  Hemodynamic: No evidence of pulmonary hypertension.  - c/w ASA, Metoprolol, Statin and prasugrel at home doses.      Problem/Plan - 3:  ·  Problem: Hypertension.  Plan: BP has been stable. Scr 1.7  - c/w Losartan 100mg/d, Metoprolol ER 100mg/d  - diuretic held.. to be restarted upon discharge.        Problem/Plan - 4:  ·  Problem: Renal failure.  Plan: Likely CKD 3. Scr 1.5  - c/w Losartan,      Problem/Plan - 5:  ·  Problem: Prophylactic measure.  Plan: heparin sc.     COVID vaccine up to date.  PT was also c/s and recommended MICHELLE. however, family would like to take him home.     Pt is cleared for discharge by Dr. Marrero and follow up with PMD and cardiologist.    This is a 81M with h/o CAD (multiple stents, but placed many years prior) and HTN presents after being found on the floor after a fall. Circumstances leading to fall unknown; thus, cannot say for certain if there was syncope. He was admitted on Tele and remained hemodynamically stable. Plan of care is outlined as below-    1. R/O Syncope- Patient had an unwitnessed fall in the Kitchen at home, he couldn't get up by himself per Grand daughter (MD Cori) [phone]. No c/o chest pain, SOB, palpitations, dizziness.  He has been drinking more alcohol than usual after his sister  recently. Never had withdrawal symptoms or ETOH rehab. He has good balance and had no fall in the past.   - Recent stress test was normal per Dr. Persaud. CT brain, C- spine unremarkable ? NPH  - No Tele event, orthostatic vitals are normal, Echo showed EF 75%, hyperdynamic ventricle.  - PT evaluated, fall precaution maintained  - CIWA with symptoms triggered Ativan prn, Thiamin, Folate was ordered  - He was also on 1:1 observation  - Grand daughter took him home today as he showed no s/s of ETOH withdrawal and remained hemodynamically stable    2. CAD (coronary artery disease)- No chest pain, SOB  - c/w ASA, Metoprolol, Statin and prasugrel at home doses.     3. Hypertension-  Plan: BP has been stable. Scr 1.7  - c/w Losartan 100mg/d, Metoprolol ER 100mg/d  - diuretic held.. to be restarted upon discharge, family was aware.     4. Chronic renal failure- Likely CKD 3. Scr 1.5  - c/w Losartan, held diuretic    COVID vaccine up to date.  PT recommended MICHELLE. however, family would like to take him home.

## 2021-06-08 NOTE — PROGRESS NOTE ADULT - PROBLEM SELECTOR PLAN 1
No witnessed fall in the Kitchen at home, couldn't get up by himself per Grand daughter (MD Cori) [phone]. He doesn't fall frequently and didn't c/o chest pain, SOB, palpitations, dizziness.  As per conversation he has been drinking more alcohol than usual after his sister  recently. Never had withdrawal symptoms or rehab. He has good balance and had no fall in the past.   Recent stress test was normal per Dr Persaud  - Tele, orthostatic vitals, Echo ordered  - PT, fall precaution   - CIWA with symptoms triggered Ativan prn, Thiamin, Folate  - 1:1 observation  ** also spoke to daughter- Itzel
After a non-witnessed fall in the Kitchen at home, he couldn't get up by himself per Grand daughter (MD Cori) [phone]. No c/o chest pain, SOB, palpitations, dizziness.  Patient has been drinking more alcohol than usual after his sister  recently. Never had withdrawal symptoms or ETOH rehab. He has good balance and had no fall in the past.   - Recent stress test was normal per Dr. Persaud. CT brain, C- spine unremarkable ? NPH  - No Tele event, orthostatic vitals are normal,     awaiting on Echo  - PT, fall precaution   - CIWA with symptoms triggered Ativan prn, Thiamin, Folate  - 1:1 observation  ** Grand daughter (MD Cori) [phone].

## 2021-06-08 NOTE — DISCHARGE NOTE PROVIDER - NSDCCPCAREPLAN_GEN_ALL_CORE_FT
PRINCIPAL DISCHARGE DIAGNOSIS  Diagnosis: Syncope  Assessment and Plan of Treatment: HOME CARE INSTRUCTIONS  Have someone stay with you until you feel stable.  Do not drive, operate machinery, or play sports until your caregiver says it is okay.  Keep all follow-up appointments as directed by your caregiver.   Lie down right away if you start feeling like you might faint. Breathe deeply and steadily. Wait until all the symptoms have passed.Drink enough fluids to keep your urine clear or pale yellow.  If you are taking blood pressure or heart medicine, get up slowly, taking several minutes to sit and then stand. This can reduce dizziness.  SEEK IMMEDIATE MEDICAL CARE IF:  You have a severe headache.  You have unusual pain in the chest, abdomen, or back.  You are bleeding from the mouth or rectum, or you have black or tarry stool.  You have an irregular or very fast heartbeat.  You have pain with breathing.  You have repeated fainting or seizure-like jerking during an episode.  You faint when sitting or lying down.  You have confusion.  You have difficulty walking.  You have severe weakness.  You have vision problems.  If you fainted, call your local emergency services [phone]. Do not drive yourself to the hospital

## 2021-06-11 PROBLEM — I10 ESSENTIAL (PRIMARY) HYPERTENSION: Chronic | Status: ACTIVE | Noted: 2021-06-07

## 2021-06-11 PROBLEM — I25.10 ATHEROSCLEROTIC HEART DISEASE OF NATIVE CORONARY ARTERY WITHOUT ANGINA PECTORIS: Chronic | Status: ACTIVE | Noted: 2021-06-07

## 2021-06-17 ENCOUNTER — NON-APPOINTMENT (OUTPATIENT)
Age: 81
End: 2021-06-17

## 2021-06-17 ENCOUNTER — APPOINTMENT (OUTPATIENT)
Dept: CARDIOLOGY | Facility: CLINIC | Age: 81
End: 2021-06-17
Payer: MEDICARE

## 2021-06-17 VITALS
WEIGHT: 203 LBS | HEART RATE: 80 BPM | OXYGEN SATURATION: 98 % | RESPIRATION RATE: 12 BRPM | SYSTOLIC BLOOD PRESSURE: 151 MMHG | BODY MASS INDEX: 32.62 KG/M2 | DIASTOLIC BLOOD PRESSURE: 71 MMHG | TEMPERATURE: 97.3 F | HEIGHT: 66 IN

## 2021-06-17 VITALS — SYSTOLIC BLOOD PRESSURE: 120 MMHG | DIASTOLIC BLOOD PRESSURE: 64 MMHG

## 2021-06-17 DIAGNOSIS — R55 SYNCOPE AND COLLAPSE: ICD-10-CM

## 2021-06-17 PROCEDURE — 93000 ELECTROCARDIOGRAM COMPLETE: CPT

## 2021-06-17 PROCEDURE — 99072 ADDL SUPL MATRL&STAF TM PHE: CPT

## 2021-06-17 PROCEDURE — 99214 OFFICE O/P EST MOD 30 MIN: CPT | Mod: 25

## 2021-06-20 NOTE — PHYSICAL EXAM
[General Appearance - Well Developed] : well developed [Normal Appearance] : normal appearance [Well Groomed] : well groomed [General Appearance - Well Nourished] : well nourished [No Deformities] : no deformities [General Appearance - In No Acute Distress] : no acute distress [Normal Conjunctiva] : the conjunctiva exhibited no abnormalities [Normal Jugular Venous A Waves Present] : normal jugular venous A waves present [Normal Jugular Venous V Waves Present] : normal jugular venous V waves present [No Jugular Venous Doherty A Waves] : no jugular venous doherty A waves [Respiration, Rhythm And Depth] : normal respiratory rhythm and effort [Exaggerated Use Of Accessory Muscles For Inspiration] : no accessory muscle use [Auscultation Breath Sounds / Voice Sounds] : lungs were clear to auscultation bilaterally [Normal Rate] : normal [Rhythm Regular] : regular [Normal S1] : normal S1 [Normal S2] : normal S2 [I] : a grade 1 [No Pitting Edema] : no pitting edema present [Bowel Sounds] : normal bowel sounds [Abdomen Soft] : soft [Abdomen Tenderness] : non-tender [Abdomen Mass (___ Cm)] : no abdominal mass palpated [Abnormal Walk] : normal gait [Gait - Sufficient For Exercise Testing] : the gait was sufficient for exercise testing [Nail Clubbing] : no clubbing of the fingernails [Cyanosis, Localized] : no localized cyanosis [Petechial Hemorrhages (___cm)] : no petechial hemorrhages [Skin Color & Pigmentation] : normal skin color and pigmentation [] : no rash [No Skin Ulcers] : no skin ulcer [Oriented To Time, Place, And Person] : oriented to person, place, and time [Affect] : the affect was normal [Mood] : the mood was normal [No Anxiety] : not feeling anxious [FreeTextEntry1] : He was wearing a face mask during the examination.  [Right Carotid Bruit] : no bruit heard over the right carotid [S3] : no S3 [Left Carotid Bruit] : no bruit heard over the left carotid [Bruit] : no bruit heard

## 2021-06-20 NOTE — CARDIOLOGY SUMMARY
[___] : [unfilled] [LVEF ___%] : LVEF [unfilled]% [None] : no pulmonary hypertension [de-identified] : Exercise Nuclear Stress Test performed on 4/14/2021: Poor exercise capacity (5 METS). No ischemic ECG changes. Hypertensive blood pressure response. Overall preserved left ventricular ejection fraction with an EF= 70% and hypokinesis of the basal inferior, distal inferior, and inferoapical walls. Medium sized, moderate defects of the inferior and inferoapical walls that are predominantly fixed, suggestive of infarct with mild salima-infarct ischemia. The patient refused prone imaging.   [de-identified] : 6/8/2021: Hyperdynamic left ventricle with an EF= 75%. Mildly dilated left atrium.

## 2021-06-20 NOTE — HISTORY OF PRESENT ILLNESS
[FreeTextEntry1] : Patient is an 81 year old man with a history of CAD status post stents in the past, HTN, hyperlipidemia, and former tobacco use who presents today for follow up of coronary artery disease and evaluation of syncope. He is accompanied by his wife today and they mention that he experienced a syncopal episode a week ago which the family attributes to excessive drinking of alcohol. He was admitted for a day and was discharged on 6/8. She states that he was not drinking much water and since discharge he has been drinking 3 bottles of water. He otherwise states that he has been feeling well denying any chest pain, dyspnea at rest, palpitations, headaches, and dizziness.

## 2021-06-20 NOTE — DISCUSSION/SUMMARY
[FreeTextEntry1] : IMPRESSION: Mr. GABRIEL is a 81 year old man with a history of CAD status post stents in the past, HTN, hyperlipidemia, and former tobacco use who presents today for follow up of coronary artery disease and evaluation of syncope.  \par \par PLAN:\par 1. It is unclear as to the etiology of his syncope, however, his family feels that this episode was secondary to excessive alcohol use and not drinking enough water. His wife will make sure that he starts drinking more water. I have placed a 1 week ZIO patch to evaluate for any arrhythmias that may have contributed to his syncope. He was in sinus rhythm in his ECG today without any ectopy.\par 2. He had mild salima-infarct ischemia on his nuclear stress test. He will be managed medically and will continue on ASA 81 mg daily and Prasugrel 10 mg daily. We should consider either stopping his Prasugrel or changing to Clopidogrel, however, will need to see if we can figure out why he was started on Prasugrel in the first place.  \par 3. His blood pressure is fine, thus he will continue on Imdur 30 mg daily, Metoprolol 100 mg daily, and Hyzaar 100-25 mg daily. He understands the importance of diet modification and limiting his sodium intake.\par 4. His goal LDL is less than 70 given his CAD. He will continue on Simvastatin 40 mg daily and will try to obtain his most recent blood test from your office.\par 5. I will be checking a CBC and CMP today. \par 6. I will schedule him for a Telehealth once I have the results of his ZIO patch.

## 2021-09-21 ENCOUNTER — NON-APPOINTMENT (OUTPATIENT)
Age: 81
End: 2021-09-21

## 2021-09-21 ENCOUNTER — APPOINTMENT (OUTPATIENT)
Dept: CARDIOLOGY | Facility: CLINIC | Age: 81
End: 2021-09-21
Payer: MEDICARE

## 2021-09-21 VITALS
BODY MASS INDEX: 32.14 KG/M2 | SYSTOLIC BLOOD PRESSURE: 143 MMHG | RESPIRATION RATE: 12 BRPM | DIASTOLIC BLOOD PRESSURE: 71 MMHG | HEART RATE: 79 BPM | OXYGEN SATURATION: 97 % | WEIGHT: 200 LBS | HEIGHT: 66 IN | TEMPERATURE: 98 F

## 2021-09-21 VITALS — DIASTOLIC BLOOD PRESSURE: 60 MMHG | SYSTOLIC BLOOD PRESSURE: 124 MMHG

## 2021-09-21 PROCEDURE — 99214 OFFICE O/P EST MOD 30 MIN: CPT | Mod: 25

## 2021-09-21 PROCEDURE — 93000 ELECTROCARDIOGRAM COMPLETE: CPT

## 2022-01-02 LAB
ALBUMIN SERPL ELPH-MCNC: 4.2 G/DL
ALP BLD-CCNC: 63 U/L
ALT SERPL-CCNC: 17 U/L
ANION GAP SERPL CALC-SCNC: 12 MMOL/L
AST SERPL-CCNC: 24 U/L
BASOPHILS # BLD AUTO: 0.03 K/UL
BASOPHILS NFR BLD AUTO: 0.4 %
BILIRUB SERPL-MCNC: 0.5 MG/DL
BUN SERPL-MCNC: 24 MG/DL
CALCIUM SERPL-MCNC: 9.1 MG/DL
CHLORIDE SERPL-SCNC: 105 MMOL/L
CO2 SERPL-SCNC: 24 MMOL/L
CREAT SERPL-MCNC: 1.44 MG/DL
EOSINOPHIL # BLD AUTO: 0.28 K/UL
EOSINOPHIL NFR BLD AUTO: 3.8 %
GLUCOSE SERPL-MCNC: 106 MG/DL
HCT VFR BLD CALC: 35.4 %
HGB BLD-MCNC: 11.3 G/DL
IMM GRANULOCYTES NFR BLD AUTO: 0.3 %
LYMPHOCYTES # BLD AUTO: 0.8 K/UL
LYMPHOCYTES NFR BLD AUTO: 10.9 %
MAN DIFF?: NORMAL
MCHC RBC-ENTMCNC: 30.5 PG
MCHC RBC-ENTMCNC: 31.9 GM/DL
MCV RBC AUTO: 95.7 FL
MONOCYTES # BLD AUTO: 0.82 K/UL
MONOCYTES NFR BLD AUTO: 11.1 %
NEUTROPHILS # BLD AUTO: 5.42 K/UL
NEUTROPHILS NFR BLD AUTO: 73.5 %
PLATELET # BLD AUTO: 253 K/UL
POTASSIUM SERPL-SCNC: 4.7 MMOL/L
PROT SERPL-MCNC: 6.7 G/DL
RBC # BLD: 3.7 M/UL
RBC # FLD: 13.7 %
SODIUM SERPL-SCNC: 141 MMOL/L
WBC # FLD AUTO: 7.37 K/UL

## 2022-01-02 NOTE — CARDIOLOGY SUMMARY
[___] : [unfilled] [LVEF ___%] : LVEF [unfilled]% [None] : no pulmonary hypertension [de-identified] : 7 day ZIO patch performed on 6/17/2021: Sinus Rhythm with overall rare ectopy. Rare paroxysms of atrial tachycardia. [de-identified] : Exercise Nuclear Stress Test performed on 4/14/2021: Poor exercise capacity (5 METS). No ischemic ECG changes. Hypertensive blood pressure response. Overall preserved left ventricular ejection fraction with an EF= 70% and hypokinesis of the basal inferior, distal inferior, and inferoapical walls. Medium sized, moderate defects of the inferior and inferoapical walls that are predominantly fixed, suggestive of infarct with mild salima-infarct ischemia. The patient refused prone imaging.   [de-identified] : 6/8/2021: Hyperdynamic left ventricle with an EF= 75%. Mildly dilated left atrium.

## 2022-01-02 NOTE — PHYSICAL EXAM
[General Appearance - Well Developed] : well developed [Normal Appearance] : normal appearance [Well Groomed] : well groomed [General Appearance - Well Nourished] : well nourished [No Deformities] : no deformities [General Appearance - In No Acute Distress] : no acute distress [Normal Conjunctiva] : the conjunctiva exhibited no abnormalities [Normal Jugular Venous A Waves Present] : normal jugular venous A waves present [Normal Jugular Venous V Waves Present] : normal jugular venous V waves present [No Jugular Venous Doherty A Waves] : no jugular venous doherty A waves [Respiration, Rhythm And Depth] : normal respiratory rhythm and effort [Exaggerated Use Of Accessory Muscles For Inspiration] : no accessory muscle use [Auscultation Breath Sounds / Voice Sounds] : lungs were clear to auscultation bilaterally [Normal Rate] : normal [Rhythm Regular] : regular [Normal S1] : normal S1 [Normal S2] : normal S2 [I] : a grade 1 [No Pitting Edema] : no pitting edema present [Bowel Sounds] : normal bowel sounds [Abdomen Soft] : soft [Abdomen Tenderness] : non-tender [Abnormal Walk] : normal gait [Gait - Sufficient For Exercise Testing] : the gait was sufficient for exercise testing [Nail Clubbing] : no clubbing of the fingernails [Cyanosis, Localized] : no localized cyanosis [Petechial Hemorrhages (___cm)] : no petechial hemorrhages [Skin Color & Pigmentation] : normal skin color and pigmentation [] : no rash [No Skin Ulcers] : no skin ulcer [Oriented To Time, Place, And Person] : oriented to person, place, and time [Affect] : the affect was normal [Mood] : the mood was normal [No Anxiety] : not feeling anxious [FreeTextEntry1] : He was wearing a face mask during the examination.  [S3] : no S3 [Right Carotid Bruit] : no bruit heard over the right carotid [Left Carotid Bruit] : no bruit heard over the left carotid [Bruit] : no bruit heard

## 2022-01-02 NOTE — DISCUSSION/SUMMARY
[FreeTextEntry1] : IMPRESSION: Mr. GABRIEL is a 81 year old man with a history of CAD status post stents in the past, HTN, hyperlipidemia, and former tobacco use who presents today for follow up of coronary artery disease.\par \par PLAN:\par 1. He has not had any recurrent episodes of syncope since his last visit with me. His ZIO patch revealed rare paroxysms of atrial tachycardia. He did not have any ectopy on his ECG that was performed today. He understands the importance of staying well hydrated.  He will continue on his current dose of Metoprolol.\par 2. He had mild salima-infarct ischemia on his nuclear stress test that was performed earlier this year. He will be managed medically and will continue on ASA 81 mg daily and Prasugrel 10 mg daily. We should consider either stopping his Prasugrel or changing to Clopidogrel, however, will need to see if we can figure out why he was started on Prasugrel in the first place.  \par 3. His blood pressure is fine, thus he will continue on Imdur 30 mg daily, Metoprolol 100 mg daily, and Hyzaar 100-25 mg daily. He understands the importance of diet modification and limiting his sodium intake.\par 4. His goal LDL is less than 70 given his CAD. He will continue on Simvastatin 40 mg daily and will try to obtain his most recent blood test from your office.\par 5. He will follow up with me in 4 months or sooner should he experience any symptoms in the interim.

## 2022-01-02 NOTE — HISTORY OF PRESENT ILLNESS
[FreeTextEntry1] : Patient is an 81 year old man with a history of CAD status post stents in the past, HTN, hyperlipidemia, and former tobacco use who presents today for follow up of coronary artery disease. He states that he has tried to drink more water, although his wife states that it is difficult to get him to drink water.  He otherwise states that he has been feeling well denying any chest pain, dyspnea at rest, palpitations, headaches, and dizziness.

## 2022-01-12 ENCOUNTER — APPOINTMENT (OUTPATIENT)
Dept: CARDIOLOGY | Facility: CLINIC | Age: 82
End: 2022-01-12
Payer: MEDICARE

## 2022-01-12 ENCOUNTER — NON-APPOINTMENT (OUTPATIENT)
Age: 82
End: 2022-01-12

## 2022-01-12 VITALS
DIASTOLIC BLOOD PRESSURE: 76 MMHG | OXYGEN SATURATION: 99 % | RESPIRATION RATE: 12 BRPM | TEMPERATURE: 96.4 F | WEIGHT: 197 LBS | BODY MASS INDEX: 31.66 KG/M2 | HEIGHT: 66 IN | SYSTOLIC BLOOD PRESSURE: 135 MMHG | HEART RATE: 81 BPM

## 2022-01-12 PROCEDURE — 93000 ELECTROCARDIOGRAM COMPLETE: CPT

## 2022-01-12 PROCEDURE — 99214 OFFICE O/P EST MOD 30 MIN: CPT | Mod: 25

## 2022-01-12 NOTE — DISCUSSION/SUMMARY
[FreeTextEntry1] : IMPRESSION: Mr. GABRIEL is an 81 year old man with a history of CAD status post stents in the past, HTN, hyperlipidemia, recent COVID infection, and former tobacco use who presents today for follow up of coronary artery disease.\par \par PLAN:\par 1. He has not had any recurrent episodes of syncope since his last visit with me. His ZIO patch revealed rare paroxysms of atrial tachycardia. He did not have any ectopy on his ECG that was performed today. He understands the importance of staying well hydrated.  He will continue on his current dose of Metoprolol.\par 2. He had mild salima-infarct ischemia on his nuclear stress test that was performed about 9 months ago. He will be managed medically at this time. He mentions not taking his aspirin recently. I explained my concern with him continuing on Effient at this dose given his age. Will decrease his Effient to 5 mg daily and will take ASA 81 mg on a daily basis.   \par 3. His blood pressure is fine, thus he will continue on Imdur 30 mg daily, Metoprolol 100 mg daily, and Hyzaar 100-25 mg daily. He understands the importance of diet modification and limiting his sodium intake.\par 4. His goal LDL is less than 70 given his CAD. He will continue on Simvastatin 40 mg daily and will try to obtain his most recent blood test from your office.\par 5. He will follow up with me in 3 months or sooner should he experience any symptoms in the interim.

## 2022-01-12 NOTE — CARDIOLOGY SUMMARY
[___] : [unfilled] [LVEF ___%] : LVEF [unfilled]% [None] : no pulmonary hypertension [de-identified] : 7 day ZIO patch performed on 6/17/2021: Sinus Rhythm with overall rare ectopy. Rare paroxysms of atrial tachycardia. [de-identified] : Exercise Nuclear Stress Test performed on 4/14/2021: Poor exercise capacity (5 METS). No ischemic ECG changes. Hypertensive blood pressure response. Overall preserved left ventricular ejection fraction with an EF= 70% and hypokinesis of the basal inferior, distal inferior, and inferoapical walls. Medium sized, moderate defects of the inferior and inferoapical walls that are predominantly fixed, suggestive of infarct with mild salima-infarct ischemia. The patient refused prone imaging.   [de-identified] : 6/8/2021: Hyperdynamic left ventricle with an EF= 75%. Mildly dilated left atrium.

## 2022-01-12 NOTE — HISTORY OF PRESENT ILLNESS
[FreeTextEntry1] : Patient is an 81 year old man with a history of CAD status post stents in the past, HTN, hyperlipidemia, and former tobacco use who presents today for follow up of coronary artery disease. He states that he has tried to drink more water, although his wife states that it is difficult to get him to drink water.  He and his wife tested positive 3 days prior to [place] and he mentions a mild cough. He otherwise denies any chest pain, dyspnea at rest, palpitations, headaches, and dizziness.

## 2022-04-04 ENCOUNTER — APPOINTMENT (OUTPATIENT)
Dept: CARDIOLOGY | Facility: CLINIC | Age: 82
End: 2022-04-04

## 2022-05-26 ENCOUNTER — APPOINTMENT (OUTPATIENT)
Dept: CARDIOLOGY | Facility: CLINIC | Age: 82
End: 2022-05-26
Payer: MEDICARE

## 2022-05-26 ENCOUNTER — NON-APPOINTMENT (OUTPATIENT)
Age: 82
End: 2022-05-26

## 2022-05-26 VITALS — SYSTOLIC BLOOD PRESSURE: 120 MMHG | DIASTOLIC BLOOD PRESSURE: 60 MMHG

## 2022-05-26 VITALS
TEMPERATURE: 97.3 F | WEIGHT: 198 LBS | HEART RATE: 75 BPM | SYSTOLIC BLOOD PRESSURE: 168 MMHG | RESPIRATION RATE: 12 BRPM | BODY MASS INDEX: 31.82 KG/M2 | DIASTOLIC BLOOD PRESSURE: 73 MMHG | OXYGEN SATURATION: 98 % | HEIGHT: 66 IN

## 2022-05-26 PROCEDURE — 93000 ELECTROCARDIOGRAM COMPLETE: CPT

## 2022-05-26 PROCEDURE — 99214 OFFICE O/P EST MOD 30 MIN: CPT | Mod: 25

## 2022-05-30 NOTE — HISTORY OF PRESENT ILLNESS
[FreeTextEntry1] : Patient is an 82 year old man with a history of CAD status post stents in the past, HTN, hyperlipidemia, prior COVID infection, and former tobacco use who presents today for follow up of coronary artery disease. He mentions occasional episodes of dizziness. He otherwise denies any chest pain, dyspnea at rest, palpitations, and headaches.

## 2022-05-30 NOTE — DISCUSSION/SUMMARY
[FreeTextEntry1] : IMPRESSION: Mr. GABRIEL is an 82 year old man with a history of CAD status post stents in the past, HTN, hyperlipidemia, prior COVID infection, and former tobacco use who presents today for follow up of coronary artery disease. \par \par PLAN:\par 1. He has not had any recurrent episodes of syncope since his last visit with me. His ZIO patch revealed rare paroxysms of atrial tachycardia. He did not have any ectopy on his ECG that was performed today. He understands the importance of staying well hydrated.  He will continue on his current dose of Metoprolol.\par 2. He had mild salima-infarct ischemia on his nuclear stress test that was performed about 13 months ago. He will be managed medically at this time. He will continue on Effient 5 mg daily and ASA 81 mg on a daily basis.  I will be checking a CBC today.\par 3. His blood pressure was very good when it was repeated at the time of the physical examination. He will continue on Imdur 30 mg daily, Metoprolol 100 mg daily, and Hyzaar 100-25 mg daily. He understands the importance of diet modification and limiting his sodium intake.\par 4. His goal LDL is less than 70 given his CAD. He will continue on Simvastatin 40 mg daily. I will be checking a CMP and lipid profile today. \par 5. He will follow up with me in 4 months or sooner should he experience any symptoms in the interim.

## 2022-05-30 NOTE — CARDIOLOGY SUMMARY
[___] : [unfilled] [LVEF ___%] : LVEF [unfilled]% [None] : no pulmonary hypertension [de-identified] : 7 day ZIO patch performed on 6/17/2021: Sinus Rhythm with overall rare ectopy. Rare paroxysms of atrial tachycardia. [de-identified] : Exercise Nuclear Stress Test performed on 4/14/2021: Poor exercise capacity (5 METS). No ischemic ECG changes. Hypertensive blood pressure response. Overall preserved left ventricular ejection fraction with an EF= 70% and hypokinesis of the basal inferior, distal inferior, and inferoapical walls. Medium sized, moderate defects of the inferior and inferoapical walls that are predominantly fixed, suggestive of infarct with mild salima-infarct ischemia. The patient refused prone imaging.   [de-identified] : 6/8/2021: Hyperdynamic left ventricle with an EF= 75%. Mildly dilated left atrium.

## 2022-11-08 ENCOUNTER — APPOINTMENT (OUTPATIENT)
Dept: CARDIOLOGY | Facility: CLINIC | Age: 82
End: 2022-11-08

## 2022-11-08 ENCOUNTER — NON-APPOINTMENT (OUTPATIENT)
Age: 82
End: 2022-11-08

## 2022-11-08 VITALS
HEART RATE: 71 BPM | SYSTOLIC BLOOD PRESSURE: 172 MMHG | WEIGHT: 198 LBS | HEIGHT: 66 IN | BODY MASS INDEX: 31.82 KG/M2 | TEMPERATURE: 97.7 F | RESPIRATION RATE: 12 BRPM | OXYGEN SATURATION: 97 % | DIASTOLIC BLOOD PRESSURE: 78 MMHG

## 2022-11-08 VITALS — DIASTOLIC BLOOD PRESSURE: 68 MMHG | SYSTOLIC BLOOD PRESSURE: 134 MMHG

## 2022-11-08 LAB
25(OH)D3 SERPL-MCNC: 31.6 NG/ML
ALBUMIN SERPL ELPH-MCNC: 4.2 G/DL
ALP BLD-CCNC: 47 U/L
ALT SERPL-CCNC: 8 U/L
ANION GAP SERPL CALC-SCNC: 17 MMOL/L
AST SERPL-CCNC: 14 U/L
BASOPHILS # BLD AUTO: 0.01 K/UL
BASOPHILS NFR BLD AUTO: 0.1 %
BILIRUB SERPL-MCNC: 0.6 MG/DL
BUN SERPL-MCNC: 41 MG/DL
CALCIUM SERPL-MCNC: 9.3 MG/DL
CHLORIDE SERPL-SCNC: 100 MMOL/L
CHOLEST SERPL-MCNC: 149 MG/DL
CO2 SERPL-SCNC: 22 MMOL/L
CREAT SERPL-MCNC: 1.68 MG/DL
EGFR: 40 ML/MIN/1.73M2
EOSINOPHIL # BLD AUTO: 0.21 K/UL
EOSINOPHIL NFR BLD AUTO: 2.9 %
ESTIMATED AVERAGE GLUCOSE: 120 MG/DL
GLUCOSE SERPL-MCNC: 93 MG/DL
HBA1C MFR BLD HPLC: 5.8 %
HCT VFR BLD CALC: 42.7 %
HDLC SERPL-MCNC: 67 MG/DL
HGB BLD-MCNC: 13.3 G/DL
IMM GRANULOCYTES NFR BLD AUTO: 0.1 %
LDLC SERPL CALC-MCNC: 67 MG/DL
LYMPHOCYTES # BLD AUTO: 1.09 K/UL
LYMPHOCYTES NFR BLD AUTO: 15.2 %
MAN DIFF?: NORMAL
MCHC RBC-ENTMCNC: 29.2 PG
MCHC RBC-ENTMCNC: 31.1 GM/DL
MCV RBC AUTO: 93.8 FL
MONOCYTES # BLD AUTO: 0.78 K/UL
MONOCYTES NFR BLD AUTO: 10.9 %
NEUTROPHILS # BLD AUTO: 5.07 K/UL
NEUTROPHILS NFR BLD AUTO: 70.8 %
NONHDLC SERPL-MCNC: 82 MG/DL
PLATELET # BLD AUTO: 191 K/UL
POTASSIUM SERPL-SCNC: 4.6 MMOL/L
PROT SERPL-MCNC: 7 G/DL
RBC # BLD: 4.55 M/UL
RBC # FLD: 13.8 %
SODIUM SERPL-SCNC: 139 MMOL/L
TRIGL SERPL-MCNC: 75 MG/DL
TSH SERPL-ACNC: 0.61 UIU/ML
WBC # FLD AUTO: 7.17 K/UL

## 2022-11-08 PROCEDURE — 99214 OFFICE O/P EST MOD 30 MIN: CPT | Mod: 25

## 2022-11-08 PROCEDURE — 93000 ELECTROCARDIOGRAM COMPLETE: CPT

## 2022-11-13 NOTE — HISTORY OF PRESENT ILLNESS
[FreeTextEntry1] : Patient is an 82 year old man with a history of CAD status post stents in the past, HTN, hyperlipidemia, prior COVID infection, and former tobacco use who presents today for follow up of coronary artery disease. He states that he has been feeling relatively well from the cardiac standpoint denying any chest pain, dyspnea at rest, palpitations, dizziness, and headaches.

## 2022-11-13 NOTE — CARDIOLOGY SUMMARY
[___] : [unfilled] [LVEF ___%] : LVEF [unfilled]% [None] : no pulmonary hypertension [de-identified] : 7 day ZIO patch performed on 6/17/2021: Sinus Rhythm with overall rare ectopy. Rare paroxysms of atrial tachycardia. [de-identified] : Exercise Nuclear Stress Test performed on 4/14/2021: Poor exercise capacity (5 METS). No ischemic ECG changes. Hypertensive blood pressure response. Overall preserved left ventricular ejection fraction with an EF= 70% and hypokinesis of the basal inferior, distal inferior, and inferoapical walls. Medium sized, moderate defects of the inferior and inferoapical walls that are predominantly fixed, suggestive of infarct with mild salima-infarct ischemia. The patient refused prone imaging.   [de-identified] : 6/8/2021: Hyperdynamic left ventricle with an EF= 75%. Mildly dilated left atrium.

## 2022-11-13 NOTE — DISCUSSION/SUMMARY
[FreeTextEntry1] : IMPRESSION: Mr. GABRIEL is an 82 year old man with a history of CAD status post stents in the past, HTN, hyperlipidemia, prior COVID infection, and former tobacco use who presents today for follow up of coronary artery disease. \par \par PLAN:\par 1. He has not had any recurrent episodes of syncope since his last visit with me. His ZIO patch revealed rare paroxysms of atrial tachycardia. He did not have any ectopy on his ECG that was performed today. He understands the importance of staying well hydrated.  He will continue on his current dose of Metoprolol.\par 2. He had mild salima-infarct ischemia on his nuclear stress test that was performed about a year and a half ago. He will be managed medically at this time. He will continue on Effient 5 mg daily and ASA 81 mg on a daily basis.  \par 3. His blood pressure was acceptable when it was repeated at the time of the physical examination. He will continue on Imdur 30 mg daily, Metoprolol 100 mg daily, and Hyzaar 100-25 mg daily. He understands the importance of diet modification and limiting his sodium intake.\par 4. His goal LDL is less than 70 given his CAD. He will continue on Simvastatin 40 mg daily. His most recent LDL that I have from 6 months ago was very good. He states that he also had blood work with his new PCP recently. \par 5. He will follow up with me in 6 months or sooner should he experience any symptoms in the interim. I have asked him to schedule an echocardiogram at that time given his CAD. [EKG obtained to assist in diagnosis and management of assessed problem(s)] : EKG obtained to assist in diagnosis and management of assessed problem(s)

## 2022-11-13 NOTE — PHYSICAL EXAM
[General Appearance - Well Developed] : well developed [Normal Appearance] : normal appearance [Well Groomed] : well groomed [General Appearance - Well Nourished] : well nourished [No Deformities] : no deformities [General Appearance - In No Acute Distress] : no acute distress [Normal Conjunctiva] : the conjunctiva exhibited no abnormalities [Normal Jugular Venous A Waves Present] : normal jugular venous A waves present [Normal Jugular Venous V Waves Present] : normal jugular venous V waves present [No Jugular Venous Doherty A Waves] : no jugular venous doherty A waves [Respiration, Rhythm And Depth] : normal respiratory rhythm and effort [Auscultation Breath Sounds / Voice Sounds] : lungs were clear to auscultation bilaterally [Exaggerated Use Of Accessory Muscles For Inspiration] : no accessory muscle use [Normal Rate] : normal [Rhythm Regular] : regular [Normal S1] : normal S1 [Normal S2] : normal S2 [I] : a grade 1 [No Pitting Edema] : no pitting edema present [Bowel Sounds] : normal bowel sounds [Abdomen Soft] : soft [Abdomen Tenderness] : non-tender [Abnormal Walk] : normal gait [Nail Clubbing] : no clubbing of the fingernails [Cyanosis, Localized] : no localized cyanosis [Petechial Hemorrhages (___cm)] : no petechial hemorrhages [Skin Color & Pigmentation] : normal skin color and pigmentation [] : no rash [No Skin Ulcers] : no skin ulcer [Oriented To Time, Place, And Person] : oriented to person, place, and time [Affect] : the affect was normal [Mood] : the mood was normal [No Anxiety] : not feeling anxious [FreeTextEntry1] : He was wearing a face mask during the examination.  [S3] : no S3 [Right Carotid Bruit] : no bruit heard over the right carotid [Left Carotid Bruit] : no bruit heard over the left carotid [Bruit] : no bruit heard

## 2023-05-08 ENCOUNTER — APPOINTMENT (OUTPATIENT)
Dept: CARDIOLOGY | Facility: CLINIC | Age: 83
End: 2023-05-08
Payer: MEDICARE

## 2023-05-08 ENCOUNTER — NON-APPOINTMENT (OUTPATIENT)
Age: 83
End: 2023-05-08

## 2023-05-08 VITALS
RESPIRATION RATE: 14 BRPM | SYSTOLIC BLOOD PRESSURE: 127 MMHG | HEART RATE: 64 BPM | BODY MASS INDEX: 32.6 KG/M2 | TEMPERATURE: 97.3 F | DIASTOLIC BLOOD PRESSURE: 68 MMHG | OXYGEN SATURATION: 97 % | WEIGHT: 202 LBS

## 2023-05-08 PROCEDURE — 93000 ELECTROCARDIOGRAM COMPLETE: CPT

## 2023-05-08 PROCEDURE — 99214 OFFICE O/P EST MOD 30 MIN: CPT | Mod: 25

## 2023-05-08 PROCEDURE — 93306 TTE W/DOPPLER COMPLETE: CPT

## 2023-05-08 RX ORDER — PRASUGREL 5 MG/1
5 TABLET, FILM COATED ORAL DAILY
Qty: 90 | Refills: 1 | Status: DISCONTINUED | COMMUNITY
Start: 1900-01-01 | End: 2023-05-08

## 2023-05-08 NOTE — PHYSICAL EXAM
[General Appearance - Well Developed] : well developed [Normal Appearance] : normal appearance [Well Groomed] : well groomed [General Appearance - Well Nourished] : well nourished [No Deformities] : no deformities [General Appearance - In No Acute Distress] : no acute distress [Normal Conjunctiva] : the conjunctiva exhibited no abnormalities [Normal Jugular Venous A Waves Present] : normal jugular venous A waves present [Normal Jugular Venous V Waves Present] : normal jugular venous V waves present [No Jugular Venous Doherty A Waves] : no jugular venous doherty A waves [Respiration, Rhythm And Depth] : normal respiratory rhythm and effort [Exaggerated Use Of Accessory Muscles For Inspiration] : no accessory muscle use [Auscultation Breath Sounds / Voice Sounds] : lungs were clear to auscultation bilaterally [Normal Rate] : normal [Rhythm Regular] : regular [Normal S1] : normal S1 [Normal S2] : normal S2 [I] : a grade 1 [No Pitting Edema] : no pitting edema present [Bowel Sounds] : normal bowel sounds [Abdomen Soft] : soft [Abdomen Tenderness] : non-tender [Abnormal Walk] : normal gait [Nail Clubbing] : no clubbing of the fingernails [Cyanosis, Localized] : no localized cyanosis [Petechial Hemorrhages (___cm)] : no petechial hemorrhages [Skin Color & Pigmentation] : normal skin color and pigmentation [] : no rash [No Skin Ulcers] : no skin ulcer [Affect] : the affect was normal [Mood] : the mood was normal [No Anxiety] : not feeling anxious [Normal Oral Mucosa] : normal oral mucosa [No Oral Pallor] : no oral pallor [No Oral Cyanosis] : no oral cyanosis [FreeTextEntry1] : Extraocular muscles intact. Anicteric sclerae. [S3] : no S3 [Right Carotid Bruit] : no bruit heard over the right carotid [Left Carotid Bruit] : no bruit heard over the left carotid [Bruit] : no bruit heard

## 2023-05-08 NOTE — HISTORY OF PRESENT ILLNESS
[FreeTextEntry1] : Patient is an 83 year old man with a history of CAD status post stents in the past, HTN, hyperlipidemia, prior COVID infection, and former tobacco use who presents today for follow up of coronary artery disease. He states that he has been feeling relatively well from the cardiac standpoint denying any chest pain, dyspnea at rest, palpitations, dizziness, and headaches. His wife states that he has been dealing with memory loss.

## 2023-05-08 NOTE — DISCUSSION/SUMMARY
[EKG obtained to assist in diagnosis and management of assessed problem(s)] : EKG obtained to assist in diagnosis and management of assessed problem(s) [FreeTextEntry1] : IMPRESSION: Mr. GABRIEL is an 83 year old man with a history of CAD status post stents in the past, HTN, hyperlipidemia, prior COVID infection, and former tobacco use who presents today for follow up of coronary artery disease. \par \par PLAN:\par 1. He has not had any recurrent episodes of syncope since his last visit with me. His ZIO patch revealed rare paroxysms of atrial tachycardia. He did not have any ectopy on his ECG that was performed today. He understands the importance of staying well hydrated.  He will continue on his current dose of Metoprolol.\par 2. He had mild salima-infarct ischemia on his nuclear stress test that was performed about 2 years ago. He will be managed medically at this time.  given his age and the fact that he has been multiple years since he had a stent placement, I have asked him to stop Effient and to change his aspirin to 81 mg twice daily.  He had an echocardiogram performed today that revealed grossly preserved left ventricular ejection fraction.  \par 3. His blood pressure is good today. He will continue on Imdur 30 mg daily, Metoprolol 100 mg daily, and Hyzaar 100-25 mg daily. He understands the importance of diet modification and limiting his sodium intake.\par 4. His goal LDL is less than 70 given his CAD.   I will be reaching out to your office for a copy of  his most recent blood work.\par 5. He will follow up with me in 4 months or sooner should he experience any symptoms in the interim.

## 2023-05-08 NOTE — CARDIOLOGY SUMMARY
[___] : [unfilled] [LVEF ___%] : LVEF [unfilled]% [None] : no pulmonary hypertension [de-identified] : 7 day ZIO patch performed on 6/17/2021: Sinus Rhythm with overall rare ectopy. Rare paroxysms of atrial tachycardia. [de-identified] : Exercise Nuclear Stress Test performed on 4/14/2021: Poor exercise capacity (5 METS). No ischemic ECG changes. Hypertensive blood pressure response. Overall preserved left ventricular ejection fraction with an EF= 70% and hypokinesis of the basal inferior, distal inferior, and inferoapical walls. Medium sized, moderate defects of the inferior and inferoapical walls that are predominantly fixed, suggestive of infarct with mild salima-infarct ischemia. The patient refused prone imaging.   [de-identified] : 6/8/2021: Hyperdynamic left ventricle with an EF= 75%. Mildly dilated left atrium.

## 2023-06-17 NOTE — PROGRESS NOTE ADULT - PROBLEM SELECTOR PLAN 4
Likely CKD 3. Scr 1.5  - c/w Losartan, Hold Diuretic
Likely CKD 3. Scr 1.7  - c/w Losartan, Hold Diuretic
No

## 2023-10-18 NOTE — PHYSICAL THERAPY INITIAL EVALUATION ADULT - SITTING BALANCE: STATIC
[employer] fax received for portable oxygen, will complete and fax back to [phone]   fair balance

## 2023-11-01 ENCOUNTER — APPOINTMENT (OUTPATIENT)
Dept: CARDIOLOGY | Facility: CLINIC | Age: 83
End: 2023-11-01
Payer: MEDICARE

## 2023-11-01 ENCOUNTER — NON-APPOINTMENT (OUTPATIENT)
Age: 83
End: 2023-11-01

## 2023-11-01 VITALS
HEIGHT: 66 IN | OXYGEN SATURATION: 98 % | RESPIRATION RATE: 15 BRPM | WEIGHT: 199 LBS | DIASTOLIC BLOOD PRESSURE: 70 MMHG | SYSTOLIC BLOOD PRESSURE: 121 MMHG | HEART RATE: 73 BPM | BODY MASS INDEX: 31.98 KG/M2

## 2023-11-01 PROCEDURE — 93000 ELECTROCARDIOGRAM COMPLETE: CPT

## 2023-11-01 PROCEDURE — 99214 OFFICE O/P EST MOD 30 MIN: CPT | Mod: 25

## 2024-01-18 RX ORDER — SIMVASTATIN 40 MG/1
40 TABLET, FILM COATED ORAL
Qty: 1 | Refills: 0 | Status: ACTIVE | COMMUNITY
Start: 1900-01-01 | End: 1900-01-01

## 2024-03-07 ENCOUNTER — APPOINTMENT (OUTPATIENT)
Dept: CARDIOLOGY | Facility: CLINIC | Age: 84
End: 2024-03-07
Payer: MEDICARE

## 2024-03-07 ENCOUNTER — NON-APPOINTMENT (OUTPATIENT)
Age: 84
End: 2024-03-07

## 2024-03-07 VITALS
BODY MASS INDEX: 31.02 KG/M2 | OXYGEN SATURATION: 97 % | TEMPERATURE: 97.2 F | RESPIRATION RATE: 12 BRPM | HEART RATE: 82 BPM | SYSTOLIC BLOOD PRESSURE: 103 MMHG | WEIGHT: 193 LBS | DIASTOLIC BLOOD PRESSURE: 76 MMHG | HEIGHT: 66 IN

## 2024-03-07 DIAGNOSIS — I25.10 ATHEROSCLEROTIC HEART DISEASE OF NATIVE CORONARY ARTERY W/OUT ANGINA PECTORIS: ICD-10-CM

## 2024-03-07 PROCEDURE — 99214 OFFICE O/P EST MOD 30 MIN: CPT | Mod: 25

## 2024-03-07 PROCEDURE — 93000 ELECTROCARDIOGRAM COMPLETE: CPT

## 2024-03-07 RX ORDER — METOPROLOL SUCCINATE 100 MG/1
100 TABLET, EXTENDED RELEASE ORAL
Qty: 90 | Refills: 1 | Status: ACTIVE | COMMUNITY
Start: 1900-01-01 | End: 1900-01-01

## 2024-03-07 RX ORDER — ISOSORBIDE MONONITRATE 30 MG/1
30 TABLET, EXTENDED RELEASE ORAL
Qty: 90 | Refills: 1 | Status: ACTIVE | COMMUNITY
Start: 1900-01-01 | End: 1900-01-01

## 2024-03-07 RX ORDER — LOSARTAN POTASSIUM AND HYDROCHLOROTHIAZIDE 25; 100 MG/1; MG/1
100-25 TABLET ORAL
Qty: 90 | Refills: 0 | Status: ACTIVE | COMMUNITY
Start: 1900-01-01 | End: 1900-01-01

## 2024-03-07 RX ORDER — ASPIRIN 81 MG
81 TABLET, DELAYED RELEASE (ENTERIC COATED) ORAL
Qty: 180 | Refills: 1 | Status: ACTIVE | COMMUNITY
Start: 2023-05-08 | End: 1900-01-01

## 2024-03-07 NOTE — DISCUSSION/SUMMARY
[EKG obtained to assist in diagnosis and management of assessed problem(s)] : EKG obtained to assist in diagnosis and management of assessed problem(s) [FreeTextEntry1] : IMPRESSION: Mr. GABRIEL is an 83 year old man with a history of CAD status post stents in the past, HTN, hyperlipidemia, prior COVID infection, and former tobacco use who presents today for follow up of coronary artery disease.   PLAN: 1. He has not had any recurrent episodes of syncope since his last visit with me. His ZIO patch revealed rare paroxysms of atrial tachycardia. He did not have any ectopy on his ECG that was performed today. He understands the importance of staying well hydrated.  He will continue on his current dose of Metoprolol. 2. He had mild salima-infarct ischemia on his nuclear stress test that was performed about 3 years ago. He will be managed medically at this time. He will continue on aspirin 81 mg twice daily.  He had an echocardiogram performed 10 months ago that revealed grossly preserved left ventricular ejection fraction.   3. His blood pressure is good today. He will continue on Imdur 30 mg daily, Metoprolol 100 mg daily, and Hyzaar 100-25 mg daily. He understands the importance of diet modification and limiting his sodium intake. 4. His goal LDL is less than 70 given his CAD.  He will continue on simvastatin 40 mg daily.  He states that he will be seeing you for blood work.  His most recent LDL from April 2023 was okay at 79.  5. He will follow up with me in 4 months or sooner should he experience any symptoms in the interim.

## 2024-03-07 NOTE — CARDIOLOGY SUMMARY
[___] : [unfilled] [LVEF ___%] : LVEF [unfilled]% [None] : no pulmonary hypertension [de-identified] : Exercise Nuclear Stress Test performed on 4/14/2021: Poor exercise capacity (5 METS). No ischemic ECG changes. Hypertensive blood pressure response. Overall preserved left ventricular ejection fraction with an EF= 70% and hypokinesis of the basal inferior, distal inferior, and inferoapical walls. Medium sized, moderate defects of the inferior and inferoapical walls that are predominantly fixed, suggestive of infarct with mild salima-infarct ischemia. The patient refused prone imaging.   [de-identified] : 7 day ZIO patch performed on 6/17/2021: Sinus Rhythm with overall rare ectopy. Rare paroxysms of atrial tachycardia. [de-identified] : 5/8/2023: Endocardium not well visualized; grossly preserved left ventricular ejection fraction. EF is approximately 60-65%. Moderate concentric left ventricular hypertrophy. Mild diastolic dysfunction. Mildly enlarged right ventricular cavity size and normal systolic function. The left atrium is severely dilated. The right atrium is mildly dilated. Trace mitral regurgitation. Mitral annular calcification and calcified mitral valve leaflets with mildly decreased opening. 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 Mild mitral stenosis. The aortic valve was not well visualized, however, it appears to be calcified with grossly normal opening. Trace aortic regurgitation. No echocardiographic evidence of pulmonary hypertension. Mild tricuspid regurgitation. PASP= 26 mmHg. Trace pericardial effusion. The aortic root at sinuses of Valsalva is mildly dilated, measuring 4.15 cm.   6/8/2021: Hyperdynamic left ventricle with an EF= 75%. Mildly dilated left atrium.

## 2024-03-07 NOTE — HISTORY OF PRESENT ILLNESS
[FreeTextEntry1] : Patient is an 83 year old man with a history of CAD status post stents in the past, HTN, hyperlipidemia, prior COVID infection, and former tobacco use who presents today for follow up of coronary artery disease. He states that he has been feeling relatively well from the cardiac standpoint denying any chest pain, dyspnea at rest, palpitations, dizziness, and headaches. His wife states that he has been dealing with memory loss, which has continued to get worse.

## 2024-03-07 NOTE — PHYSICAL EXAM
[General Appearance - Well Developed] : well developed [Normal Appearance] : normal appearance [Well Groomed] : well groomed [General Appearance - Well Nourished] : well nourished [No Deformities] : no deformities [General Appearance - In No Acute Distress] : no acute distress [Normal Conjunctiva] : the conjunctiva exhibited no abnormalities [No Oral Pallor] : no oral pallor [Normal Oral Mucosa] : normal oral mucosa [No Oral Cyanosis] : no oral cyanosis [Normal Jugular Venous A Waves Present] : normal jugular venous A waves present [Normal Jugular Venous V Waves Present] : normal jugular venous V waves present [No Jugular Venous Doherty A Waves] : no jugular venous doherty A waves [Respiration, Rhythm And Depth] : normal respiratory rhythm and effort [Auscultation Breath Sounds / Voice Sounds] : lungs were clear to auscultation bilaterally [Exaggerated Use Of Accessory Muscles For Inspiration] : no accessory muscle use [Normal S1] : normal S1 [Normal Rate] : normal [Rhythm Regular] : regular [Normal S2] : normal S2 [I] : a grade 1 [No Pitting Edema] : no pitting edema present [Abdomen Tenderness] : non-tender [Bowel Sounds] : normal bowel sounds [Abdomen Soft] : soft [Abnormal Walk] : normal gait [Nail Clubbing] : no clubbing of the fingernails [Petechial Hemorrhages (___cm)] : no petechial hemorrhages [Cyanosis, Localized] : no localized cyanosis [Skin Color & Pigmentation] : normal skin color and pigmentation [] : no rash [No Skin Ulcers] : no skin ulcer [Affect] : the affect was normal [Mood] : the mood was normal [No Anxiety] : not feeling anxious [S3] : no S3 [Left Carotid Bruit] : no bruit heard over the left carotid [Right Carotid Bruit] : no bruit heard over the right carotid [Bruit] : no bruit heard [FreeTextEntry1] : His gait is slow.

## 2024-07-22 NOTE — DISCUSSION/SUMMARY
[FreeTextEntry1] : IMPRESSION: Mr. GABRIEL is an 84 year old man with a history of CAD status post stents in the past, HTN, hyperlipidemia, prior COVID infection, and former tobacco use who presents today for follow up of coronary artery disease.   PLAN: 1. He has not had any recurrent episodes of syncope since his last visit with me. His ZIO patch revealed rare paroxysms of atrial tachycardia. He did not have any ectopy on his ECG that was performed today. He understands the importance of staying well hydrated.  He will continue on his current dose of Metoprolol. 2. He had mild salima-infarct ischemia on his nuclear stress test that was performed about 3 years ago. He will be managed medically at this time. He will continue on aspirin 81 mg twice daily.  He had an echocardiogram performed 10 months ago that revealed grossly preserved left ventricular ejection fraction.   3. His blood pressure is good today. He will continue on Imdur 30 mg daily, Metoprolol 100 mg daily, and Hyzaar 100-25 mg daily. He understands the importance of diet modification and limiting his sodium intake. 4. His goal LDL is less than 70 given his CAD.  He will continue on simvastatin 40 mg daily.  He states that he will be seeing you for blood work.  His most recent LDL from April 2023 was okay at 79.  5. He will follow up with me in 4 months or sooner should he experience any symptoms in the interim.

## 2024-07-22 NOTE — CARDIOLOGY SUMMARY
[de-identified] : 7 day ZIO patch performed on 6/17/2021: Sinus Rhythm with overall rare ectopy. Rare paroxysms of atrial tachycardia. [de-identified] : Exercise Nuclear Stress Test performed on 4/14/2021: Poor exercise capacity (5 METS). No ischemic ECG changes. Hypertensive blood pressure response. Overall preserved left ventricular ejection fraction with an EF= 70% and hypokinesis of the basal inferior, distal inferior, and inferoapical walls. Medium sized, moderate defects of the inferior and inferoapical walls that are predominantly fixed, suggestive of infarct with mild salima-infarct ischemia. The patient refused prone imaging.   [de-identified] : 5/8/2023: Endocardium not well visualized; grossly preserved left ventricular ejection fraction. EF is approximately 60-65%. Moderate concentric left ventricular hypertrophy. Mild diastolic dysfunction. Mildly enlarged right ventricular cavity size and normal systolic function. The left atrium is severely dilated. The right atrium is mildly dilated. Trace mitral regurgitation. Mitral annular calcification and calcified mitral valve leaflets with mildly decreased opening. 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 Mild mitral stenosis. The aortic valve was not well visualized, however, it appears to be calcified with grossly normal opening. Trace aortic regurgitation. No echocardiographic evidence of pulmonary hypertension. Mild tricuspid regurgitation. PASP= 26 mmHg. Trace pericardial effusion. The aortic root at sinuses of Valsalva is mildly dilated, measuring 4.15 cm.   6/8/2021: Hyperdynamic left ventricle with an EF= 75%. Mildly dilated left atrium.  [___] : [unfilled] [LVEF ___%] : LVEF [unfilled]% [None] : no pulmonary hypertension

## 2024-07-22 NOTE — PHYSICAL EXAM
[General Appearance - Well Developed] : well developed [Normal Appearance] : normal appearance [Well Groomed] : well groomed [General Appearance - Well Nourished] : well nourished [No Deformities] : no deformities [General Appearance - In No Acute Distress] : no acute distress [Normal Conjunctiva] : the conjunctiva exhibited no abnormalities [Normal Oral Mucosa] : normal oral mucosa [No Oral Pallor] : no oral pallor [No Oral Cyanosis] : no oral cyanosis [Normal Jugular Venous A Waves Present] : normal jugular venous A waves present [Normal Jugular Venous V Waves Present] : normal jugular venous V waves present [No Jugular Venous Doehrty A Waves] : no jugular venous doherty A waves [Respiration, Rhythm And Depth] : normal respiratory rhythm and effort [Exaggerated Use Of Accessory Muscles For Inspiration] : no accessory muscle use [Auscultation Breath Sounds / Voice Sounds] : lungs were clear to auscultation bilaterally [Normal Rate] : normal [Rhythm Regular] : regular [Normal S1] : normal S1 [Normal S2] : normal S2 [S3] : no S3 [I] : a grade 1 [Right Carotid Bruit] : no bruit heard over the right carotid [Left Carotid Bruit] : no bruit heard over the left carotid [Bruit] : no bruit heard [No Pitting Edema] : no pitting edema present [Bowel Sounds] : normal bowel sounds [Abdomen Soft] : soft [Abdomen Tenderness] : non-tender [FreeTextEntry1] : His gait is slow.  [Nail Clubbing] : no clubbing of the fingernails [Cyanosis, Localized] : no localized cyanosis [Petechial Hemorrhages (___cm)] : no petechial hemorrhages [Skin Color & Pigmentation] : normal skin color and pigmentation [] : no rash [No Skin Ulcers] : no skin ulcer [Affect] : the affect was normal [Mood] : the mood was normal [No Anxiety] : not feeling anxious

## 2024-07-22 NOTE — HISTORY OF PRESENT ILLNESS
[FreeTextEntry1] : Patient is an 84 year old man with a history of CAD status post stents in the past, HTN, hyperlipidemia, prior COVID infection, and former tobacco use who presents today for follow up of coronary artery disease. He states that he has been feeling relatively well from the cardiac standpoint denying any chest pain, dyspnea at rest, palpitations, dizziness, and headaches. His wife states that he has been dealing with memory loss, which has continued to get worse.

## 2024-07-29 ENCOUNTER — APPOINTMENT (OUTPATIENT)
Dept: CARDIOLOGY | Facility: CLINIC | Age: 84
End: 2024-07-29

## 2024-07-29 ENCOUNTER — NON-APPOINTMENT (OUTPATIENT)
Age: 84
End: 2024-07-29

## 2024-12-04 ENCOUNTER — APPOINTMENT (OUTPATIENT)
Dept: WOUND CARE | Facility: CLINIC | Age: 84
End: 2024-12-04

## 2025-01-14 ENCOUNTER — APPOINTMENT (OUTPATIENT)
Dept: HOME HEALTH SERVICES | Facility: HOME HEALTH | Age: 85
End: 2025-01-14

## 2025-02-24 ENCOUNTER — APPOINTMENT (OUTPATIENT)
Dept: CARDIOLOGY | Facility: CLINIC | Age: 85
End: 2025-02-24